# Patient Record
Sex: MALE | Race: BLACK OR AFRICAN AMERICAN | NOT HISPANIC OR LATINO | Employment: FULL TIME | URBAN - METROPOLITAN AREA
[De-identification: names, ages, dates, MRNs, and addresses within clinical notes are randomized per-mention and may not be internally consistent; named-entity substitution may affect disease eponyms.]

---

## 2021-06-09 ENCOUNTER — DOCUMENTATION ONLY (OUTPATIENT)
Dept: BARIATRICS | Facility: CLINIC | Age: 41
End: 2021-06-09

## 2021-06-10 ENCOUNTER — TELEPHONE (OUTPATIENT)
Dept: BARIATRICS | Facility: CLINIC | Age: 41
End: 2021-06-10

## 2021-06-28 ENCOUNTER — OFFICE VISIT (OUTPATIENT)
Dept: PODIATRY | Facility: CLINIC | Age: 41
End: 2021-06-28
Payer: COMMERCIAL

## 2021-06-28 VITALS — BODY MASS INDEX: 45.1 KG/M2 | HEIGHT: 70 IN | WEIGHT: 315 LBS

## 2021-06-28 DIAGNOSIS — M21.6X2 ACQUIRED EQUINUS DEFORMITY OF BOTH FEET: ICD-10-CM

## 2021-06-28 DIAGNOSIS — M21.6X1 ACQUIRED EQUINUS DEFORMITY OF BOTH FEET: ICD-10-CM

## 2021-06-28 DIAGNOSIS — M20.11 VALGUS DEFORMITY OF BOTH GREAT TOES: Primary | ICD-10-CM

## 2021-06-28 DIAGNOSIS — M19.079 ARTHRITIS OF FOOT: ICD-10-CM

## 2021-06-28 DIAGNOSIS — M20.12 VALGUS DEFORMITY OF BOTH GREAT TOES: Primary | ICD-10-CM

## 2021-06-28 DIAGNOSIS — M79.671 FOOT PAIN, BILATERAL: ICD-10-CM

## 2021-06-28 DIAGNOSIS — M79.672 FOOT PAIN, BILATERAL: ICD-10-CM

## 2021-06-28 PROCEDURE — 99204 OFFICE O/P NEW MOD 45 MIN: CPT | Mod: 25,S$GLB,, | Performed by: PODIATRIST

## 2021-06-28 PROCEDURE — 3008F BODY MASS INDEX DOCD: CPT | Mod: CPTII,S$GLB,, | Performed by: PODIATRIST

## 2021-06-28 PROCEDURE — 29540 STRAPPING ANKLE &/FOOT: CPT | Mod: 50,S$GLB,, | Performed by: PODIATRIST

## 2021-06-28 PROCEDURE — 99999 PR PBB SHADOW E&M-EST. PATIENT-LVL III: ICD-10-PCS | Mod: PBBFAC,,, | Performed by: PODIATRIST

## 2021-06-28 PROCEDURE — 1126F PR PAIN SEVERITY QUANTIFIED, NO PAIN PRESENT: ICD-10-PCS | Mod: S$GLB,,, | Performed by: PODIATRIST

## 2021-06-28 PROCEDURE — 29540 PR STRAPPING; ANKLE &/OR FOOT: ICD-10-PCS | Mod: 50,S$GLB,, | Performed by: PODIATRIST

## 2021-06-28 PROCEDURE — 3008F PR BODY MASS INDEX (BMI) DOCUMENTED: ICD-10-PCS | Mod: CPTII,S$GLB,, | Performed by: PODIATRIST

## 2021-06-28 PROCEDURE — 99204 PR OFFICE/OUTPT VISIT, NEW, LEVL IV, 45-59 MIN: ICD-10-PCS | Mod: 25,S$GLB,, | Performed by: PODIATRIST

## 2021-06-28 PROCEDURE — 1126F AMNT PAIN NOTED NONE PRSNT: CPT | Mod: S$GLB,,, | Performed by: PODIATRIST

## 2021-06-28 PROCEDURE — 99999 PR PBB SHADOW E&M-EST. PATIENT-LVL III: CPT | Mod: PBBFAC,,, | Performed by: PODIATRIST

## 2021-06-28 RX ORDER — MELOXICAM 15 MG/1
15 TABLET ORAL DAILY
Qty: 30 TABLET | Refills: 0 | Status: SHIPPED | OUTPATIENT
Start: 2021-06-28 | End: 2022-06-30

## 2021-06-28 RX ORDER — PHENTERMINE HYDROCHLORIDE 37.5 MG/1
37.5 TABLET ORAL DAILY
COMMUNITY
Start: 2021-02-04 | End: 2022-06-28

## 2021-10-11 ENCOUNTER — HOSPITAL ENCOUNTER (OUTPATIENT)
Dept: RADIOLOGY | Facility: HOSPITAL | Age: 41
Discharge: HOME | End: 2021-10-11
Attending: ORTHOPAEDIC SURGERY
Payer: COMMERCIAL

## 2021-10-11 ENCOUNTER — OFFICE VISIT (OUTPATIENT)
Dept: ORTHOPEDICS | Facility: CLINIC | Age: 41
End: 2021-10-11
Payer: COMMERCIAL

## 2021-10-11 VITALS — WEIGHT: 315 LBS | BODY MASS INDEX: 45.1 KG/M2 | HEIGHT: 70 IN

## 2021-10-11 DIAGNOSIS — M54.50 CHRONIC LEFT-SIDED LOW BACK PAIN WITHOUT SCIATICA: Primary | ICD-10-CM

## 2021-10-11 DIAGNOSIS — G89.29 CHRONIC LEFT-SIDED LOW BACK PAIN WITHOUT SCIATICA: Primary | ICD-10-CM

## 2021-10-11 DIAGNOSIS — M51.36 DDD (DEGENERATIVE DISC DISEASE), LUMBAR: ICD-10-CM

## 2021-10-11 PROCEDURE — 1160F PR REVIEW ALL MEDS BY PRESCRIBER/CLIN PHARMACIST DOCUMENTED: ICD-10-PCS | Mod: CPTII,S$GLB,, | Performed by: ORTHOPAEDIC SURGERY

## 2021-10-11 PROCEDURE — 72110 X-RAY EXAM L-2 SPINE 4/>VWS: CPT | Mod: 26,,, | Performed by: RADIOLOGY

## 2021-10-11 PROCEDURE — 1160F RVW MEDS BY RX/DR IN RCRD: CPT | Mod: CPTII,S$GLB,, | Performed by: ORTHOPAEDIC SURGERY

## 2021-10-11 PROCEDURE — 1159F PR MEDICATION LIST DOCUMENTED IN MEDICAL RECORD: ICD-10-PCS | Mod: CPTII,S$GLB,, | Performed by: ORTHOPAEDIC SURGERY

## 2021-10-11 PROCEDURE — 99204 PR OFFICE/OUTPT VISIT, NEW, LEVL IV, 45-59 MIN: ICD-10-PCS | Mod: S$GLB,,, | Performed by: ORTHOPAEDIC SURGERY

## 2021-10-11 PROCEDURE — 1159F MED LIST DOCD IN RCRD: CPT | Mod: CPTII,S$GLB,, | Performed by: ORTHOPAEDIC SURGERY

## 2021-10-11 PROCEDURE — 99999 PR PBB SHADOW E&M-EST. PATIENT-LVL III: CPT | Mod: PBBFAC,,, | Performed by: ORTHOPAEDIC SURGERY

## 2021-10-11 PROCEDURE — 72110 X-RAY EXAM L-2 SPINE 4/>VWS: CPT | Mod: TC

## 2021-10-11 PROCEDURE — 3008F PR BODY MASS INDEX (BMI) DOCUMENTED: ICD-10-PCS | Mod: CPTII,S$GLB,, | Performed by: ORTHOPAEDIC SURGERY

## 2021-10-11 PROCEDURE — 72110 XR LUMBAR SPINE AP AND LAT WITH FLEX/EXT: ICD-10-PCS | Mod: 26,,, | Performed by: RADIOLOGY

## 2021-10-11 PROCEDURE — 3008F BODY MASS INDEX DOCD: CPT | Mod: CPTII,S$GLB,, | Performed by: ORTHOPAEDIC SURGERY

## 2021-10-11 PROCEDURE — 99999 PR PBB SHADOW E&M-EST. PATIENT-LVL III: ICD-10-PCS | Mod: PBBFAC,,, | Performed by: ORTHOPAEDIC SURGERY

## 2021-10-11 PROCEDURE — 99204 OFFICE O/P NEW MOD 45 MIN: CPT | Mod: S$GLB,,, | Performed by: ORTHOPAEDIC SURGERY

## 2021-10-11 RX ORDER — CYCLOBENZAPRINE HCL 5 MG
5 TABLET ORAL NIGHTLY
Qty: 30 TABLET | Refills: 0 | Status: SHIPPED | OUTPATIENT
Start: 2021-10-11 | End: 2021-10-21

## 2021-10-11 RX ORDER — MELOXICAM 15 MG/1
15 TABLET ORAL DAILY
Qty: 30 TABLET | Refills: 0 | Status: SHIPPED | OUTPATIENT
Start: 2021-10-11 | End: 2022-06-30

## 2021-12-07 ENCOUNTER — LAB VISIT (OUTPATIENT)
Dept: LAB | Facility: HOSPITAL | Age: 41
End: 2021-12-07
Attending: STUDENT IN AN ORGANIZED HEALTH CARE EDUCATION/TRAINING PROGRAM
Payer: COMMERCIAL

## 2021-12-07 ENCOUNTER — OFFICE VISIT (OUTPATIENT)
Dept: BARIATRICS | Facility: CLINIC | Age: 41
End: 2021-12-07
Payer: COMMERCIAL

## 2021-12-07 VITALS
OXYGEN SATURATION: 97 % | HEART RATE: 98 BPM | SYSTOLIC BLOOD PRESSURE: 136 MMHG | HEIGHT: 70 IN | DIASTOLIC BLOOD PRESSURE: 82 MMHG | BODY MASS INDEX: 45.1 KG/M2 | WEIGHT: 315 LBS

## 2021-12-07 DIAGNOSIS — E66.01 CLASS 3 SEVERE OBESITY DUE TO EXCESS CALORIES WITHOUT SERIOUS COMORBIDITY WITH BODY MASS INDEX (BMI) OF 50.0 TO 59.9 IN ADULT: ICD-10-CM

## 2021-12-07 DIAGNOSIS — Z71.3 ENCOUNTER FOR WEIGHT LOSS COUNSELING: ICD-10-CM

## 2021-12-07 DIAGNOSIS — R73.03 PREDIABETES: ICD-10-CM

## 2021-12-07 DIAGNOSIS — E55.9 VITAMIN D DEFICIENCY: ICD-10-CM

## 2021-12-07 DIAGNOSIS — E66.01 CLASS 3 SEVERE OBESITY DUE TO EXCESS CALORIES WITHOUT SERIOUS COMORBIDITY WITH BODY MASS INDEX (BMI) OF 50.0 TO 59.9 IN ADULT: Primary | ICD-10-CM

## 2021-12-07 LAB
25(OH)D3+25(OH)D2 SERPL-MCNC: 9 NG/ML (ref 30–96)
ALBUMIN SERPL BCP-MCNC: 3.8 G/DL (ref 3.5–5.2)
ALP SERPL-CCNC: 83 U/L (ref 55–135)
ALT SERPL W/O P-5'-P-CCNC: 19 U/L (ref 10–44)
ANION GAP SERPL CALC-SCNC: 9 MMOL/L (ref 8–16)
AST SERPL-CCNC: 20 U/L (ref 10–40)
BASOPHILS # BLD AUTO: 0.07 K/UL (ref 0–0.2)
BASOPHILS NFR BLD: 0.8 % (ref 0–1.9)
BILIRUB SERPL-MCNC: 0.2 MG/DL (ref 0.1–1)
BUN SERPL-MCNC: 11 MG/DL (ref 6–20)
CALCIUM SERPL-MCNC: 9.1 MG/DL (ref 8.7–10.5)
CHLORIDE SERPL-SCNC: 102 MMOL/L (ref 95–110)
CHOLEST SERPL-MCNC: 186 MG/DL (ref 120–199)
CHOLEST/HDLC SERPL: 4.7 {RATIO} (ref 2–5)
CO2 SERPL-SCNC: 28 MMOL/L (ref 23–29)
CREAT SERPL-MCNC: 1 MG/DL (ref 0.5–1.4)
DIFFERENTIAL METHOD: ABNORMAL
EOSINOPHIL # BLD AUTO: 0.6 K/UL (ref 0–0.5)
EOSINOPHIL NFR BLD: 6.8 % (ref 0–8)
ERYTHROCYTE [DISTWIDTH] IN BLOOD BY AUTOMATED COUNT: 17.4 % (ref 11.5–14.5)
EST. GFR  (AFRICAN AMERICAN): >60 ML/MIN/1.73 M^2
EST. GFR  (NON AFRICAN AMERICAN): >60 ML/MIN/1.73 M^2
ESTIMATED AVG GLUCOSE: 128 MG/DL (ref 68–131)
GLUCOSE SERPL-MCNC: 108 MG/DL (ref 70–110)
HBA1C MFR BLD: 6.1 % (ref 4–5.6)
HCT VFR BLD AUTO: 39.9 % (ref 40–54)
HDLC SERPL-MCNC: 40 MG/DL (ref 40–75)
HDLC SERPL: 21.5 % (ref 20–50)
HGB BLD-MCNC: 11.8 G/DL (ref 14–18)
IMM GRANULOCYTES # BLD AUTO: 0.03 K/UL (ref 0–0.04)
IMM GRANULOCYTES NFR BLD AUTO: 0.3 % (ref 0–0.5)
LDLC SERPL CALC-MCNC: 128.2 MG/DL (ref 63–159)
LYMPHOCYTES # BLD AUTO: 3.4 K/UL (ref 1–4.8)
LYMPHOCYTES NFR BLD: 39.9 % (ref 18–48)
MCH RBC QN AUTO: 21.7 PG (ref 27–31)
MCHC RBC AUTO-ENTMCNC: 29.6 G/DL (ref 32–36)
MCV RBC AUTO: 73 FL (ref 82–98)
MONOCYTES # BLD AUTO: 0.8 K/UL (ref 0.3–1)
MONOCYTES NFR BLD: 8.9 % (ref 4–15)
NEUTROPHILS # BLD AUTO: 3.7 K/UL (ref 1.8–7.7)
NEUTROPHILS NFR BLD: 43.3 % (ref 38–73)
NONHDLC SERPL-MCNC: 146 MG/DL
NRBC BLD-RTO: 0 /100 WBC
PLATELET # BLD AUTO: 332 K/UL (ref 150–450)
PMV BLD AUTO: 10.9 FL (ref 9.2–12.9)
POTASSIUM SERPL-SCNC: 4.2 MMOL/L (ref 3.5–5.1)
PROT SERPL-MCNC: 8.1 G/DL (ref 6–8.4)
RBC # BLD AUTO: 5.44 M/UL (ref 4.6–6.2)
SODIUM SERPL-SCNC: 139 MMOL/L (ref 136–145)
TRIGL SERPL-MCNC: 89 MG/DL (ref 30–150)
TSH SERPL DL<=0.005 MIU/L-ACNC: 1.45 UIU/ML (ref 0.4–4)
WBC # BLD AUTO: 8.63 K/UL (ref 3.9–12.7)

## 2021-12-07 PROCEDURE — 99203 PR OFFICE/OUTPT VISIT, NEW, LEVL III, 30-44 MIN: ICD-10-PCS | Mod: S$GLB,,, | Performed by: STUDENT IN AN ORGANIZED HEALTH CARE EDUCATION/TRAINING PROGRAM

## 2021-12-07 PROCEDURE — 80061 LIPID PANEL: CPT | Performed by: STUDENT IN AN ORGANIZED HEALTH CARE EDUCATION/TRAINING PROGRAM

## 2021-12-07 PROCEDURE — 83036 HEMOGLOBIN GLYCOSYLATED A1C: CPT | Performed by: STUDENT IN AN ORGANIZED HEALTH CARE EDUCATION/TRAINING PROGRAM

## 2021-12-07 PROCEDURE — 99999 PR PBB SHADOW E&M-EST. PATIENT-LVL III: CPT | Mod: PBBFAC,,, | Performed by: STUDENT IN AN ORGANIZED HEALTH CARE EDUCATION/TRAINING PROGRAM

## 2021-12-07 PROCEDURE — 84443 ASSAY THYROID STIM HORMONE: CPT | Performed by: STUDENT IN AN ORGANIZED HEALTH CARE EDUCATION/TRAINING PROGRAM

## 2021-12-07 PROCEDURE — 82306 VITAMIN D 25 HYDROXY: CPT | Performed by: STUDENT IN AN ORGANIZED HEALTH CARE EDUCATION/TRAINING PROGRAM

## 2021-12-07 PROCEDURE — 99999 PR PBB SHADOW E&M-EST. PATIENT-LVL III: ICD-10-PCS | Mod: PBBFAC,,, | Performed by: STUDENT IN AN ORGANIZED HEALTH CARE EDUCATION/TRAINING PROGRAM

## 2021-12-07 PROCEDURE — 85025 COMPLETE CBC W/AUTO DIFF WBC: CPT | Performed by: STUDENT IN AN ORGANIZED HEALTH CARE EDUCATION/TRAINING PROGRAM

## 2021-12-07 PROCEDURE — 99203 OFFICE O/P NEW LOW 30 MIN: CPT | Mod: S$GLB,,, | Performed by: STUDENT IN AN ORGANIZED HEALTH CARE EDUCATION/TRAINING PROGRAM

## 2021-12-07 PROCEDURE — 80053 COMPREHEN METABOLIC PANEL: CPT | Performed by: STUDENT IN AN ORGANIZED HEALTH CARE EDUCATION/TRAINING PROGRAM

## 2021-12-07 PROCEDURE — 36415 COLL VENOUS BLD VENIPUNCTURE: CPT | Performed by: STUDENT IN AN ORGANIZED HEALTH CARE EDUCATION/TRAINING PROGRAM

## 2021-12-07 RX ORDER — SEMAGLUTIDE 1.34 MG/ML
INJECTION, SOLUTION SUBCUTANEOUS
Qty: 1 PEN | Refills: 0 | Status: SHIPPED | OUTPATIENT
Start: 2021-12-07 | End: 2022-01-13 | Stop reason: SDUPTHER

## 2021-12-08 ENCOUNTER — PATIENT MESSAGE (OUTPATIENT)
Dept: BARIATRICS | Facility: CLINIC | Age: 41
End: 2021-12-08
Payer: COMMERCIAL

## 2021-12-08 RX ORDER — ERGOCALCIFEROL 1.25 MG/1
50000 CAPSULE ORAL WEEKLY
Qty: 12 CAPSULE | Refills: 0 | Status: SHIPPED | OUTPATIENT
Start: 2021-12-08 | End: 2022-03-08

## 2022-01-13 RX ORDER — SEMAGLUTIDE 1.34 MG/ML
0.5 INJECTION, SOLUTION SUBCUTANEOUS
Qty: 1 PEN | Refills: 2 | Status: SHIPPED | OUTPATIENT
Start: 2022-01-13 | End: 2022-01-13

## 2022-01-13 RX ORDER — SEMAGLUTIDE 1.34 MG/ML
0.5 INJECTION, SOLUTION SUBCUTANEOUS
Qty: 1 PEN | Refills: 2 | Status: SHIPPED | OUTPATIENT
Start: 2022-01-13 | End: 2022-02-22

## 2022-01-13 NOTE — ADDENDUM NOTE
Addended by: JEANNETTE BURROWS on: 1/13/2022 12:42 PM     Modules accepted: Orders     Subjective:       Patient ID: Gopi Garcia is a 62 y.o. male Body mass index is 38.62 kg/m².    Chief Complaint: Abdominal Pain    This patient is new to me.  Referring Provider: Katerin Sol for COLITIS.     Abdominal Pain   This is a new problem. Episode onset: started ~8-10 days. The problem occurs constantly. The problem has been gradually improving (mild improvement). The pain is located in the LLQ and LUQ. The pain is at a severity of 4/10 (currently). The quality of the pain is aching and sharp (sharp with pressure to site). The abdominal pain does not radiate. Pertinent negatives include no belching, constipation, diarrhea, dysuria, fever, flatus, hematochezia, melena, nausea, vomiting or weight loss. Exacerbated by: stretching, bending forward. Relieved by: sitting or standing helps decrease the pain. He has tried antibiotics (AUGMENTIN 875-125 mg bid x 10 days, bentyl 20 mg bid prn, percocet PRN taking 2 times a day, mobic daily) for the symptoms. The treatment provided mild relief. Prior diagnostic workup includes CT scan. There is no history of Crohn's disease, GERD, pancreatitis, PUD or ulcerative colitis.     Review of Systems   Constitutional: Positive for appetite change (decreased). Negative for chills, fatigue, fever and weight loss.   HENT: Negative for sore throat and trouble swallowing.    Respiratory: Negative for cough, choking and shortness of breath.    Cardiovascular: Negative for chest pain.   Gastrointestinal: Positive for abdominal pain. Negative for anal bleeding, blood in stool, constipation, diarrhea, flatus, hematochezia, melena, nausea, rectal pain and vomiting.   Genitourinary: Negative for difficulty urinating, dysuria and flank pain.   Skin: Positive for rash (had rash to chest and forearms (reports it was nonraised and blanched) when symptoms first started; rash has resolved).   Neurological: Negative for weakness.       Past Medical History:   Diagnosis Date     Anxiety     AR (allergic rhinitis)     CAD (coronary artery disease)     Chronic low back pain     Colon polyp 2010    told to repeat 5 yrs    Coronary artery disease     Depression     Diverticulosis     ED (erectile dysfunction)     Fatty liver     Hepatomegaly     HTN (hypertension)     Hyperlipidemia     Myocardial infarct, old     Pulmonary nodule, left 1/2/2020    Repeat CT January 2021    Sleep apnea      Past Surgical History:   Procedure Laterality Date    CARDIAC CATHETERIZATION  2006    COLONOSCOPY  52 years old    colon polyps removed per patient report    INGUINAL HERNIA REPAIR Right 2011    VASECTOMY  2005     Family History   Problem Relation Age of Onset    Lung cancer Father     Alzheimer's disease Mother     Heart attack Paternal Grandfather     Prostate cancer Brother     Colon polyps Brother     Colon cancer Neg Hx     Crohn's disease Neg Hx     Ulcerative colitis Neg Hx     Stomach cancer Neg Hx     Esophageal cancer Neg Hx      Wt Readings from Last 10 Encounters:   04/28/20 (!) 140.2 kg (309 lb)   04/23/20 (!) 140.2 kg (309 lb)   01/16/20 (!) 140.6 kg (309 lb 15.5 oz)   12/16/19 (!) 138.8 kg (306 lb)   08/13/19 (!) 139.7 kg (307 lb 15.7 oz)   11/15/18 (!) 138.1 kg (304 lb 7.3 oz)   09/07/18 135.2 kg (298 lb)   08/31/18 136.1 kg (300 lb)   08/28/18 135.2 kg (298 lb)   08/10/18 (!) 136.2 kg (300 lb 4.3 oz)     Lab Results   Component Value Date    WBC 9.19 04/23/2020    HGB 16.2 04/23/2020    HCT 46.6 04/23/2020    MCV 91 04/23/2020     04/23/2020     CMP  Sodium   Date Value Ref Range Status   01/14/2020 141 136 - 145 mmol/L Final     Potassium   Date Value Ref Range Status   01/14/2020 4.0 3.5 - 5.1 mmol/L Final     Chloride   Date Value Ref Range Status   01/14/2020 101 95 - 110 mmol/L Final     CO2   Date Value Ref Range Status   01/14/2020 30 (H) 23 - 29 mmol/L Final     Glucose   Date Value Ref Range Status   01/14/2020 124 (H) 70 - 110 mg/dL Final      BUN, Bld   Date Value Ref Range Status   01/14/2020 12 6 - 20 mg/dL Final     Creatinine   Date Value Ref Range Status   01/14/2020 1.1 0.5 - 1.4 mg/dL Final     Calcium   Date Value Ref Range Status   01/14/2020 9.2 8.7 - 10.5 mg/dL Final     Total Protein   Date Value Ref Range Status   01/14/2020 7.0 6.0 - 8.4 g/dL Final     Albumin   Date Value Ref Range Status   01/14/2020 4.3 3.5 - 5.2 g/dL Final     Total Bilirubin   Date Value Ref Range Status   01/14/2020 1.1 (H) 0.1 - 1.0 mg/dL Final     Comment:     For infants and newborns, interpretation of results should be based  on gestational age, weight and in agreement with clinical  observations.  Premature Infant recommended reference ranges:  Up to 24 hours.............<8.0 mg/dL  Up to 48 hours............<12.0 mg/dL  3-5 days..................<15.0 mg/dL  6-29 days.................<15.0 mg/dL       Alkaline Phosphatase   Date Value Ref Range Status   01/14/2020 83 55 - 135 U/L Final     AST   Date Value Ref Range Status   01/14/2020 24 10 - 40 U/L Final     ALT   Date Value Ref Range Status   01/14/2020 18 10 - 44 U/L Final     Anion Gap   Date Value Ref Range Status   01/14/2020 10 8 - 16 mmol/L Final     eGFR if    Date Value Ref Range Status   01/14/2020 >60.0 >60 mL/min/1.73 m^2 Final     eGFR if non    Date Value Ref Range Status   01/14/2020 >60.0 >60 mL/min/1.73 m^2 Final     Comment:     Calculation used to obtain the estimated glomerular filtration  rate (eGFR) is the CKD-EPI equation.        Lab Results   Component Value Date    TSH 0.881 01/14/2020     Reviewed prior medical records including radiology report of 4/23/2020 CT abdomen pelvis.    Objective:      Physical Exam   Constitutional: He is oriented to person, place, and time. He appears well-developed and well-nourished. No distress.   Patient is wearing a face mask due to COVID 19 concerns.   Eyes: Pupils are equal, round, and reactive to light.  Conjunctivae are normal. No scleral icterus.   Pulmonary/Chest: Effort normal and breath sounds normal. No respiratory distress. He has no wheezes.   Abdominal: Soft. Normal appearance and bowel sounds are normal. He exhibits no distension, no abdominal bruit and no mass. There is tenderness (mild) in the left lower quadrant. There is no rigidity, no rebound, no guarding, no tenderness at McBurney's point and negative Pires's sign.   Neurological: He is alert and oriented to person, place, and time.   Skin: Skin is warm and dry. No rash noted. He is not diaphoretic. No erythema. No pallor.   Non-jaundiced   Psychiatric: He has a normal mood and affect. His behavior is normal. Judgment and thought content normal.   Nursing note and vitals reviewed.      Assessment:       1. Left sided abdominal pain    2. Abnormal CT scan, gastrointestinal tract    3. Colitis    4. History of diverticulosis    5. Serum total bilirubin elevated    6. Decreased appetite    7. History of rash and nonspecific skin eruption    8. Anticoagulant long-term use        Plan:       Left sided abdominal pain, Abnormal CT scan, gastrointestinal tract, History of diverticulosis, & Colitis  - schedule Colonoscopy to be done in 4-6 weeks, discussed procedure with the patient, verbalized understanding  - CONTINUE BENTYL 20 MG BID PRN AS DIRECTED  - CONTINUE AUGMENTIN AS PRESCRIBED; if symptoms do not continue to improve/worsen, we may need to extend antibiotic course or try a different course of antibiotics; will reassess at follow-up appointment in 1 week.  - discussed the diagnosis of diverticulosis and diverticulitis, advised to continue a low fiber diet for the next 4 weeks and then slowly increase fiber in diet. Advised a low fiber diet is recommended for diverticulitis (for about 4 weeks), but to prevent diverticulitis, high fiber diet is recommended.  -Recommended high fiber diet after episode has completely resolved. Recommended daily  exercise, adequate water intake (six 8-oz glasses of water daily), and high fiber diet. OTC fiber supplements are recommended if diet does not reach daily fiber goal (25 grams daily), such as Metamucil, Citrucel, or FiberCon (take as directed, separate from other oral medications by >2 hours).  - instructed patient that if symptoms do not resolve or if worsen prior to colonoscopy to contact us or go to ER, patient verbalized understanding    Serum total bilirubin elevated  - minimize/avoid alcohol and tylenol products, & follow-up with PCP for continued evaluation and management; if specialist is needed, recommend seeing hepatology.    Decreased appetite  - discussed about possible medication for this; patient declined medication at this time; if it persist, we may need EGD to further evaluate symptom  - encouraged PO intake and daily calorie counts to ensure adequate nutrition is taken in, recommend at least 2,000 calories a day  - recommend nutritional drinks, such as Boost, Ensure or Glucerna, to supplement nutrition needs  - avoid/minimize use of NSAIDs- since they can cause GI upset, bleeding and/or ulcers. If NSAID must be taken, recommend take with food.  - discussed with patient that a side effect of narcotic pain medications is nausea, advised patient to talk to provider who manages pain medication, patient verbalized understanding    History of rash and nonspecific skin eruption  Recommend follow-up with Primary Care Provider for continued evaluation and management if rash recurs.    Anticoagulant long-term use  - informed patient that the anticoagulant(s) will likely need to be held for endoscopy, nurse will confirm with endoscopist, cardiologist, and/or PCP.    Follow up in about 1 week (around 5/5/2020), or if symptoms worsen or fail to improve.      If no improvement in symptoms or symptoms worsen, call/follow-up at clinic or go to ER.

## 2022-06-28 ENCOUNTER — OFFICE VISIT (OUTPATIENT)
Dept: BARIATRICS | Facility: CLINIC | Age: 42
End: 2022-06-28
Payer: COMMERCIAL

## 2022-06-28 VITALS
HEIGHT: 70 IN | TEMPERATURE: 99 F | HEART RATE: 90 BPM | WEIGHT: 315 LBS | BODY MASS INDEX: 45.1 KG/M2 | OXYGEN SATURATION: 95 % | SYSTOLIC BLOOD PRESSURE: 142 MMHG | DIASTOLIC BLOOD PRESSURE: 86 MMHG

## 2022-06-28 DIAGNOSIS — E66.01 CLASS 3 SEVERE OBESITY DUE TO EXCESS CALORIES WITH SERIOUS COMORBIDITY AND BODY MASS INDEX (BMI) OF 45.0 TO 49.9 IN ADULT: Primary | ICD-10-CM

## 2022-06-28 DIAGNOSIS — Z71.3 ENCOUNTER FOR WEIGHT LOSS COUNSELING: ICD-10-CM

## 2022-06-28 DIAGNOSIS — E55.9 VITAMIN D DEFICIENCY: ICD-10-CM

## 2022-06-28 PROCEDURE — 99213 OFFICE O/P EST LOW 20 MIN: CPT | Mod: S$GLB,,, | Performed by: STUDENT IN AN ORGANIZED HEALTH CARE EDUCATION/TRAINING PROGRAM

## 2022-06-28 PROCEDURE — 3077F SYST BP >= 140 MM HG: CPT | Mod: CPTII,S$GLB,, | Performed by: STUDENT IN AN ORGANIZED HEALTH CARE EDUCATION/TRAINING PROGRAM

## 2022-06-28 PROCEDURE — 1160F PR REVIEW ALL MEDS BY PRESCRIBER/CLIN PHARMACIST DOCUMENTED: ICD-10-PCS | Mod: CPTII,S$GLB,, | Performed by: STUDENT IN AN ORGANIZED HEALTH CARE EDUCATION/TRAINING PROGRAM

## 2022-06-28 PROCEDURE — 3077F PR MOST RECENT SYSTOLIC BLOOD PRESSURE >= 140 MM HG: ICD-10-PCS | Mod: CPTII,S$GLB,, | Performed by: STUDENT IN AN ORGANIZED HEALTH CARE EDUCATION/TRAINING PROGRAM

## 2022-06-28 PROCEDURE — 99213 PR OFFICE/OUTPT VISIT, EST, LEVL III, 20-29 MIN: ICD-10-PCS | Mod: S$GLB,,, | Performed by: STUDENT IN AN ORGANIZED HEALTH CARE EDUCATION/TRAINING PROGRAM

## 2022-06-28 PROCEDURE — 1159F PR MEDICATION LIST DOCUMENTED IN MEDICAL RECORD: ICD-10-PCS | Mod: CPTII,S$GLB,, | Performed by: STUDENT IN AN ORGANIZED HEALTH CARE EDUCATION/TRAINING PROGRAM

## 2022-06-28 PROCEDURE — 3079F DIAST BP 80-89 MM HG: CPT | Mod: CPTII,S$GLB,, | Performed by: STUDENT IN AN ORGANIZED HEALTH CARE EDUCATION/TRAINING PROGRAM

## 2022-06-28 PROCEDURE — 3008F PR BODY MASS INDEX (BMI) DOCUMENTED: ICD-10-PCS | Mod: CPTII,S$GLB,, | Performed by: STUDENT IN AN ORGANIZED HEALTH CARE EDUCATION/TRAINING PROGRAM

## 2022-06-28 PROCEDURE — 3079F PR MOST RECENT DIASTOLIC BLOOD PRESSURE 80-89 MM HG: ICD-10-PCS | Mod: CPTII,S$GLB,, | Performed by: STUDENT IN AN ORGANIZED HEALTH CARE EDUCATION/TRAINING PROGRAM

## 2022-06-28 PROCEDURE — 99999 PR PBB SHADOW E&M-EST. PATIENT-LVL III: ICD-10-PCS | Mod: PBBFAC,,, | Performed by: STUDENT IN AN ORGANIZED HEALTH CARE EDUCATION/TRAINING PROGRAM

## 2022-06-28 PROCEDURE — 99999 PR PBB SHADOW E&M-EST. PATIENT-LVL III: CPT | Mod: PBBFAC,,, | Performed by: STUDENT IN AN ORGANIZED HEALTH CARE EDUCATION/TRAINING PROGRAM

## 2022-06-28 PROCEDURE — 1160F RVW MEDS BY RX/DR IN RCRD: CPT | Mod: CPTII,S$GLB,, | Performed by: STUDENT IN AN ORGANIZED HEALTH CARE EDUCATION/TRAINING PROGRAM

## 2022-06-28 PROCEDURE — 3008F BODY MASS INDEX DOCD: CPT | Mod: CPTII,S$GLB,, | Performed by: STUDENT IN AN ORGANIZED HEALTH CARE EDUCATION/TRAINING PROGRAM

## 2022-06-28 PROCEDURE — 1159F MED LIST DOCD IN RCRD: CPT | Mod: CPTII,S$GLB,, | Performed by: STUDENT IN AN ORGANIZED HEALTH CARE EDUCATION/TRAINING PROGRAM

## 2022-06-28 RX ORDER — SEMAGLUTIDE 1.34 MG/ML
1 INJECTION, SOLUTION SUBCUTANEOUS
Qty: 3 PEN | Refills: 0 | Status: SHIPPED | OUTPATIENT
Start: 2022-06-28 | End: 2022-09-06 | Stop reason: SDUPTHER

## 2022-06-28 RX ORDER — ERGOCALCIFEROL 1.25 MG/1
50000 CAPSULE ORAL WEEKLY
Qty: 12 CAPSULE | Refills: 0 | Status: SHIPPED | OUTPATIENT
Start: 2022-06-28 | End: 2022-09-26

## 2022-06-28 NOTE — PROGRESS NOTES
Subjective:       Patient ID: Finesse Irvin is a 42 y.o. male.    Chief Complaint: Follow-up, Obesity, and Weight Check    Patient presents for treatment of obesity.   Follow-up, appointment #2    Has gained 100 lbs since pandemic    Co-morbidities:   Asthma  Prediabetes  Vitamin D Deficiency    Negative for pancreatitis  Negative for thyroid cancer     History of Weight Loss Efforts  Adipex earlier this year - stopped taking because made him feel jittery    Current Physical Activity  No regular exercise   for work    Current Eating Habits  Breakfast - skips  Lunch - fast food  Dinner - fast food; cooks about 1x/week  Snacks - chips  Beverages - powerade, water    Alcohol: occassionally    Tobacco: no      Medical Weight Loss  12/7/2021: 360.9 lbs, BMI 47.6, BFP 40.8%, .1 lbs, BMR 2463 kcal; Ozempic  6/28/2022: 345.3 lbs, BMI 45.6, BFP 40.9%, .1 lbs, BMR 2368 kcal; Ozempic    Follow-up  Pertinent negatives include no abdominal pain, chest pain, chills, fever, nausea or vomiting.     Review of Systems   Constitutional: Negative for chills and fever.   Respiratory: Negative for shortness of breath.    Cardiovascular: Negative for chest pain, palpitations and leg swelling.   Gastrointestinal: Negative for abdominal pain, nausea and vomiting.   Neurological: Negative for dizziness and light-headedness.   Psychiatric/Behavioral: The patient is not nervous/anxious.          Objective:     Results for FINESSE IRVIN (MRN [id number]) as of 12/8/2021 14:21   Ref. Range 12/7/2021 09:25   WBC Latest Ref Range: 3.90 - 12.70 K/uL 8.63   RBC Latest Ref Range: 4.60 - 6.20 M/uL 5.44   Hemoglobin Latest Ref Range: 14.0 - 18.0 g/dL 11.8 (L)   Hematocrit Latest Ref Range: 40.0 - 54.0 % 39.9 (L)   MCV Latest Ref Range: 82 - 98 fL 73 (L)   MCH Latest Ref Range: 27.0 - 31.0 pg 21.7 (L)   MCHC Latest Ref Range: 32.0 - 36.0 g/dL 29.6 (L)   RDW Latest Ref Range: 11.5 - 14.5 % 17.4 (H)   Platelets Latest Ref Range:  150 - 450 K/uL 332   MPV Latest Ref Range: 9.2 - 12.9 fL 10.9   Gran % Latest Ref Range: 38.0 - 73.0 % 43.3   Lymph % Latest Ref Range: 18.0 - 48.0 % 39.9   Mono % Latest Ref Range: 4.0 - 15.0 % 8.9   Eosinophil % Latest Ref Range: 0.0 - 8.0 % 6.8   Basophil % Latest Ref Range: 0.0 - 1.9 % 0.8   Immature Granulocytes Latest Ref Range: 0.0 - 0.5 % 0.3   Gran # (ANC) Latest Ref Range: 1.8 - 7.7 K/uL 3.7   Lymph # Latest Ref Range: 1.0 - 4.8 K/uL 3.4   Mono # Latest Ref Range: 0.3 - 1.0 K/uL 0.8   Eos # Latest Ref Range: 0.0 - 0.5 K/uL 0.6 (H)   Baso # Latest Ref Range: 0.00 - 0.20 K/uL 0.07   Immature Grans (Abs) Latest Ref Range: 0.00 - 0.04 K/uL 0.03   nRBC Latest Ref Range: 0 /100 WBC 0   Differential Method Unknown Automated   Sodium Latest Ref Range: 136 - 145 mmol/L 139   Potassium Latest Ref Range: 3.5 - 5.1 mmol/L 4.2   Chloride Latest Ref Range: 95 - 110 mmol/L 102   CO2 Latest Ref Range: 23 - 29 mmol/L 28   Anion Gap Latest Ref Range: 8 - 16 mmol/L 9   BUN Latest Ref Range: 6 - 20 mg/dL 11   Creatinine Latest Ref Range: 0.5 - 1.4 mg/dL 1.0   eGFR if non African American Latest Ref Range: >60 mL/min/1.73 m^2 >60.0   eGFR if African American Latest Ref Range: >60 mL/min/1.73 m^2 >60.0   Glucose Latest Ref Range: 70 - 110 mg/dL 108   Calcium Latest Ref Range: 8.7 - 10.5 mg/dL 9.1   Alkaline Phosphatase Latest Ref Range: 55 - 135 U/L 83   PROTEIN TOTAL Latest Ref Range: 6.0 - 8.4 g/dL 8.1   Albumin Latest Ref Range: 3.5 - 5.2 g/dL 3.8   BILIRUBIN TOTAL Latest Ref Range: 0.1 - 1.0 mg/dL 0.2   AST Latest Ref Range: 10 - 40 U/L 20   ALT Latest Ref Range: 10 - 44 U/L 19   Triglycerides Latest Ref Range: 30 - 150 mg/dL 89   Cholesterol Latest Ref Range: 120 - 199 mg/dL 186   HDL Latest Ref Range: 40 - 75 mg/dL 40   HDL/Cholesterol Ratio Latest Ref Range: 20.0 - 50.0 % 21.5   LDL Cholesterol External Latest Ref Range: 63.0 - 159.0 mg/dL 128.2   Non-HDL Cholesterol Latest Units: mg/dL 146   Total Cholesterol/HDL Ratio  Latest Ref Range: 2.0 - 5.0  4.7   Vit D, 25-Hydroxy Latest Ref Range: 30 - 96 ng/mL 9 (L)   Hemoglobin A1C External Latest Ref Range: 4.0 - 5.6 % 6.1 (H)   Estimated Avg Glucose Latest Ref Range: 68 - 131 mg/dL 128   TSH Latest Ref Range: 0.400 - 4.000 uIU/mL 1.450         Vitals:    06/28/22 0818   BP: (!) 142/86   Pulse: 90   Temp: 98.5 °F (36.9 °C)       Physical Exam  Vitals reviewed.   Constitutional:       General: He is not in acute distress.     Appearance: Normal appearance. He is obese. He is not ill-appearing, toxic-appearing or diaphoretic.   HENT:      Head: Normocephalic and atraumatic.   Eyes:      Extraocular Movements: Extraocular movements intact.   Cardiovascular:      Rate and Rhythm: Normal rate.   Pulmonary:      Effort: Pulmonary effort is normal. No respiratory distress.   Musculoskeletal:         General: Normal range of motion.      Cervical back: Normal range of motion.   Skin:     General: Skin is warm and dry.   Neurological:      General: No focal deficit present.      Mental Status: He is alert and oriented to person, place, and time.      Gait: Gait normal.   Psychiatric:         Mood and Affect: Mood normal.         Behavior: Behavior normal.         Assessment:       Problem List Items Addressed This Visit     Vitamin D deficiency      Other Visit Diagnoses     Class 3 severe obesity due to excess calories with serious comorbidity and body mass index (BMI) of 45.0 to 49.9 in adult    -  Primary    Encounter for weight loss counseling              Plan:   - Ozempic 1 mg weekly.     - Continue vitamin D supplements    - Referred to PCP for further workup of anemia, elevated blood pressure    - Log all food and beverage intake with a daily calorie goal of 2000 calories per day    - Moderate intensity aerobic exercise for 30 minutes 3-5x/week    - Return to clinic in 4 weeks

## 2022-06-29 ENCOUNTER — TELEPHONE (OUTPATIENT)
Dept: FAMILY MEDICINE | Facility: CLINIC | Age: 42
End: 2022-06-29
Payer: COMMERCIAL

## 2022-06-29 PROBLEM — E55.9 VITAMIN D DEFICIENCY: Status: ACTIVE | Noted: 2022-06-29

## 2022-06-30 ENCOUNTER — OFFICE VISIT (OUTPATIENT)
Dept: FAMILY MEDICINE | Facility: CLINIC | Age: 42
End: 2022-06-30
Payer: COMMERCIAL

## 2022-06-30 VITALS
TEMPERATURE: 98 F | OXYGEN SATURATION: 95 % | BODY MASS INDEX: 46.65 KG/M2 | SYSTOLIC BLOOD PRESSURE: 138 MMHG | DIASTOLIC BLOOD PRESSURE: 74 MMHG | HEIGHT: 69 IN | RESPIRATION RATE: 18 BRPM | WEIGHT: 315 LBS | HEART RATE: 90 BPM

## 2022-06-30 DIAGNOSIS — R06.83 PRIMARY SNORING: ICD-10-CM

## 2022-06-30 DIAGNOSIS — K21.9 GASTROESOPHAGEAL REFLUX DISEASE, UNSPECIFIED WHETHER ESOPHAGITIS PRESENT: ICD-10-CM

## 2022-06-30 DIAGNOSIS — J45.20 MILD INTERMITTENT EXTRINSIC ASTHMA WITHOUT COMPLICATION: ICD-10-CM

## 2022-06-30 DIAGNOSIS — R73.03 PREDIABETES: ICD-10-CM

## 2022-06-30 DIAGNOSIS — J30.2 SEASONAL ALLERGIES: ICD-10-CM

## 2022-06-30 DIAGNOSIS — Z00.00 VISIT FOR WELL MAN HEALTH CHECK: Primary | ICD-10-CM

## 2022-06-30 DIAGNOSIS — Z11.59 ENCOUNTER FOR HEPATITIS C SCREENING TEST FOR LOW RISK PATIENT: ICD-10-CM

## 2022-06-30 PROBLEM — J45.909 EXTRINSIC ASTHMA WITHOUT COMPLICATION: Status: ACTIVE | Noted: 2022-06-30

## 2022-06-30 PROCEDURE — 99999 PR PBB SHADOW E&M-EST. PATIENT-LVL V: ICD-10-PCS | Mod: PBBFAC,,, | Performed by: FAMILY MEDICINE

## 2022-06-30 PROCEDURE — 3078F DIAST BP <80 MM HG: CPT | Mod: CPTII,S$GLB,, | Performed by: FAMILY MEDICINE

## 2022-06-30 PROCEDURE — 1159F PR MEDICATION LIST DOCUMENTED IN MEDICAL RECORD: ICD-10-PCS | Mod: CPTII,S$GLB,, | Performed by: FAMILY MEDICINE

## 2022-06-30 PROCEDURE — 3075F PR MOST RECENT SYSTOLIC BLOOD PRESS GE 130-139MM HG: ICD-10-PCS | Mod: CPTII,S$GLB,, | Performed by: FAMILY MEDICINE

## 2022-06-30 PROCEDURE — 99999 PR PBB SHADOW E&M-EST. PATIENT-LVL V: CPT | Mod: PBBFAC,,, | Performed by: FAMILY MEDICINE

## 2022-06-30 PROCEDURE — 3008F BODY MASS INDEX DOCD: CPT | Mod: CPTII,S$GLB,, | Performed by: FAMILY MEDICINE

## 2022-06-30 PROCEDURE — 1159F MED LIST DOCD IN RCRD: CPT | Mod: CPTII,S$GLB,, | Performed by: FAMILY MEDICINE

## 2022-06-30 PROCEDURE — 3008F PR BODY MASS INDEX (BMI) DOCUMENTED: ICD-10-PCS | Mod: CPTII,S$GLB,, | Performed by: FAMILY MEDICINE

## 2022-06-30 PROCEDURE — 3078F PR MOST RECENT DIASTOLIC BLOOD PRESSURE < 80 MM HG: ICD-10-PCS | Mod: CPTII,S$GLB,, | Performed by: FAMILY MEDICINE

## 2022-06-30 PROCEDURE — 99386 PREV VISIT NEW AGE 40-64: CPT | Mod: S$GLB,,, | Performed by: FAMILY MEDICINE

## 2022-06-30 PROCEDURE — 1160F PR REVIEW ALL MEDS BY PRESCRIBER/CLIN PHARMACIST DOCUMENTED: ICD-10-PCS | Mod: CPTII,S$GLB,, | Performed by: FAMILY MEDICINE

## 2022-06-30 PROCEDURE — 99386 PR PREVENTIVE VISIT,NEW,40-64: ICD-10-PCS | Mod: S$GLB,,, | Performed by: FAMILY MEDICINE

## 2022-06-30 PROCEDURE — 3075F SYST BP GE 130 - 139MM HG: CPT | Mod: CPTII,S$GLB,, | Performed by: FAMILY MEDICINE

## 2022-06-30 PROCEDURE — 1160F RVW MEDS BY RX/DR IN RCRD: CPT | Mod: CPTII,S$GLB,, | Performed by: FAMILY MEDICINE

## 2022-06-30 RX ORDER — PANTOPRAZOLE SODIUM 40 MG/1
40 TABLET, DELAYED RELEASE ORAL DAILY
Qty: 30 TABLET | Refills: 1 | Status: SHIPPED | OUTPATIENT
Start: 2022-06-30 | End: 2023-06-30

## 2022-06-30 NOTE — PROGRESS NOTES
"Well Man VISIT      CHIEF COMPLAINT  Chief Complaint   Patient presents with    [facility]       HPI  Finesse Irvin is a 42 y.o. male who presents for [facility].     Social Factors  Tobacco use: yes  Ready to Quit: Yes   Alcohol: Yes on occasion  Intimate partner violence screening  "Do you feel safe in your current relationship?" Yes   "Have you ever been in a relationship in which your partner frightened you or hurt you?" No  Living Will/POA: No  Regular Exercise: No    Depression  Over the past two weeks, have you felt down, depressed, or hopeless? No  Over the past two weeks, have you felt little interest or pleasure in doing things? No    Reproductive Health  STD screening in last year: declined  HIV screening: declined    Screen for Chronic Disease  CHD Risk Factors: male gender and obesity (BMI >= 30 kg/m2)  Estimated body mass index is 51.57 kg/m² as calculated from the following:    Height as of this encounter: 5' 9" (1.753 m).    Weight as of this encounter: 158.4 kg (349 lb 3.3 oz).  Dyslipidemia screening needed: ordered  T2DM screening needed: ordered  Colonoscopy needed: n/a  PSA needed: n/a  AAA screening needed:n/a  Screen men 35 years and older, and men 20 to 34 years of age who have cardiovascular risk factors for dyslipidemia  Begin screening colonoscopies at 50 years of age in men of average risk, and continue until 75 years of age; offer fecal occult blood testing every year, flexible sigmoidoscopy every five years combined with fecal occult blood testing every three years, or colonoscopy every 10 years   The American U[employer] Association recommends offering PSA testing and digital rectal examination to well-informed men beginning at 40 years of age and continuing until life expectancy is less than 10 years  Screen once with ultrasonography in men 65 to 75 years of age if they have a family history or have smoked at [id number] cigarettes in their lifetime  Screen men with a sustained " "blood pressure greater than 135/80 mm Hg for T2DM      Immunizations  delayed    ALLERGIES and MEDS were verified.   PMHx, PSHx, FHx, SOCIALHx were updated as pertinent.    REVIEW OF SYSTEMS  Review of Systems   Constitutional: Positive for malaise/fatigue. Negative for chills and fever.   HENT: Negative.    Eyes: Negative.    Respiratory: Negative.    Cardiovascular: Negative.    Gastrointestinal: Positive for vomiting.   Genitourinary: Negative.    Musculoskeletal: Negative.    Skin: Negative.    Psychiatric/Behavioral: Negative.          PHYSICAL EXAM  VITAL SIGNS: /74   Pulse 90   Temp 98.2 °F (36.8 °C) (Oral)   Resp 18   Ht 5' 9" (1.753 m)   Wt (!) 158.4 kg (349 lb 3.3 oz)   SpO2 95%   BMI 51.57 kg/m²   GEN: Well developed, Well nourished, No acute distress.  HENT: Normocephalic, Atraumatic, Bilateral external ears normal, Nose normal, Oropharynx moist, No oral exudates.   Eyes: PERRL, EOMI, Conjunctiva normal, No discharge.   Neck: Supple, No tenderness.  Lymphatic: No cervical or supraclavicular lymphadenopathy noted.   Cardiovascular: Normal heart rate, Normal rhythm, No murmurs, No rubs, No gallops.   Thorax & Lungs: Normal breath sounds, No respiratory distress, No wheezing.  Abdomen: Soft, No tenderness, Bowel sounds normal.  Genital: deferred  Skin: Warm, Dry, No erythema, No rash.   Extremities: No edema, No tenderness.       ASSESSMENT/PLAN    Finesse was seen today for establish care.    Diagnoses and all orders for this visit:    Visit for [facility] health check  -     CBC Auto Differential; Future  -     Comprehensive Metabolic Panel; Future  -     Lipid Panel; Future  -     Urinalysis; Future  -     PSA, Screening; Future  -     Hemoglobin A1C; Future  -     Hepatitis C Antibody; Future    Encounter for hepatitis C screening test for low risk patient  -     Hepatitis C Antibody; Future    Seasonal allergies    Mild intermittent extrinsic asthma without complication    Primary snoring  - "     Ambulatory referral/consult to Sleep Disorders; Future    Prediabetes    Gastroesophageal reflux disease, unspecified whether esophagitis present  -     pantoprazole (PROTONIX) 40 MG tablet; Take 1 tablet (40 mg total) by mouth once daily.         FOLLOW UP: 3 months or sooner if needed  Vomiting at night likely from acid production secondary to sleep apnea      Ventura Pantoja MD

## 2022-08-03 ENCOUNTER — PATIENT MESSAGE (OUTPATIENT)
Dept: BARIATRICS | Facility: CLINIC | Age: 42
End: 2022-08-03
Payer: COMMERCIAL

## 2022-09-06 RX ORDER — SEMAGLUTIDE 1.34 MG/ML
1 INJECTION, SOLUTION SUBCUTANEOUS
Qty: 3 PEN | Refills: 0 | Status: SHIPPED | OUTPATIENT
Start: 2022-09-06 | End: 2022-11-10 | Stop reason: SDUPTHER

## 2022-09-26 ENCOUNTER — OFFICE VISIT (OUTPATIENT)
Dept: PULMONOLOGY | Facility: CLINIC | Age: 42
End: 2022-09-26
Payer: COMMERCIAL

## 2022-09-26 VITALS
OXYGEN SATURATION: 94 % | DIASTOLIC BLOOD PRESSURE: 85 MMHG | HEART RATE: 105 BPM | BODY MASS INDEX: 46.65 KG/M2 | SYSTOLIC BLOOD PRESSURE: 136 MMHG | HEIGHT: 69 IN | WEIGHT: 315 LBS

## 2022-09-26 DIAGNOSIS — E66.01 CLASS 3 SEVERE OBESITY DUE TO EXCESS CALORIES WITH SERIOUS COMORBIDITY AND BODY MASS INDEX (BMI) OF 50.0 TO 59.9 IN ADULT: ICD-10-CM

## 2022-09-26 DIAGNOSIS — G47.33 OSA (OBSTRUCTIVE SLEEP APNEA): Primary | ICD-10-CM

## 2022-09-26 DIAGNOSIS — R09.81 NASAL CONGESTION: ICD-10-CM

## 2022-09-26 DIAGNOSIS — R06.83 PRIMARY SNORING: ICD-10-CM

## 2022-09-26 PROBLEM — E66.09 OBESITY DUE TO EXCESS CALORIES: Status: ACTIVE | Noted: 2022-09-26

## 2022-09-26 PROCEDURE — 3008F BODY MASS INDEX DOCD: CPT | Mod: CPTII,S$GLB,, | Performed by: INTERNAL MEDICINE

## 2022-09-26 PROCEDURE — 1159F MED LIST DOCD IN RCRD: CPT | Mod: CPTII,S$GLB,, | Performed by: INTERNAL MEDICINE

## 2022-09-26 PROCEDURE — 3075F PR MOST RECENT SYSTOLIC BLOOD PRESS GE 130-139MM HG: ICD-10-PCS | Mod: CPTII,S$GLB,, | Performed by: INTERNAL MEDICINE

## 2022-09-26 PROCEDURE — 3079F DIAST BP 80-89 MM HG: CPT | Mod: CPTII,S$GLB,, | Performed by: INTERNAL MEDICINE

## 2022-09-26 PROCEDURE — 99203 OFFICE O/P NEW LOW 30 MIN: CPT | Mod: S$GLB,,, | Performed by: INTERNAL MEDICINE

## 2022-09-26 PROCEDURE — 3008F PR BODY MASS INDEX (BMI) DOCUMENTED: ICD-10-PCS | Mod: CPTII,S$GLB,, | Performed by: INTERNAL MEDICINE

## 2022-09-26 PROCEDURE — 99999 PR PBB SHADOW E&M-EST. PATIENT-LVL IV: ICD-10-PCS | Mod: PBBFAC,,, | Performed by: INTERNAL MEDICINE

## 2022-09-26 PROCEDURE — 3075F SYST BP GE 130 - 139MM HG: CPT | Mod: CPTII,S$GLB,, | Performed by: INTERNAL MEDICINE

## 2022-09-26 PROCEDURE — 99999 PR PBB SHADOW E&M-EST. PATIENT-LVL IV: CPT | Mod: PBBFAC,,, | Performed by: INTERNAL MEDICINE

## 2022-09-26 PROCEDURE — 1159F PR MEDICATION LIST DOCUMENTED IN MEDICAL RECORD: ICD-10-PCS | Mod: CPTII,S$GLB,, | Performed by: INTERNAL MEDICINE

## 2022-09-26 PROCEDURE — 99203 PR OFFICE/OUTPT VISIT, NEW, LEVL III, 30-44 MIN: ICD-10-PCS | Mod: S$GLB,,, | Performed by: INTERNAL MEDICINE

## 2022-09-26 PROCEDURE — 3079F PR MOST RECENT DIASTOLIC BLOOD PRESSURE 80-89 MM HG: ICD-10-PCS | Mod: CPTII,S$GLB,, | Performed by: INTERNAL MEDICINE

## 2022-09-26 NOTE — PATIENT INSTRUCTIONS
1.  NeilMed Sinus Rinse Regular Kit    Recipe 1/4 teaspoon of salt and 1/4 teaspoon of baking soda in distilled water.  Be sure to tilt head forward as shown in picture.          flonase or nasonex over the counter.  2 sprays per nostril daily. Be sure to tilt head forward when dispensing medication.

## 2022-09-26 NOTE — ASSESSMENT & PLAN NOTE
- The patient symptomatically has loud snoring, restless sleep, witnessed apnea with findings of elevated bmi. This warrants further investigation for possible obstructive sleep apnea.  Patient will be contacted after sleep study is done.

## 2022-09-26 NOTE — PROGRESS NOTES
Finesse Irvin  was seen as a new patient at the request of  Ventura Pantoja MD for the evaluation of  radha.    CHIEF COMPLAINT:    Chief Complaint   Patient presents with    Apnea       HISTORY OF PRESENT ILLNESS: Finesse Irvin is a 42 y.o. male is here for sleep evaluation.   Patient with loud snoring.  +witnessed apnea during sleep by girlfriend.  Feeling tire upon awake 3-4 times per week.  No parasomnia. No rls symptoms.  No cataplexy.      [place] Sleepiness Scale score during initial sleep evaluation was 0.    SLEEP ROUTINE:  Activity the hour prior to sleep: video    Bed partner:  girlfriend  Time to bed:  11;30 pm   Lights off:  off  Sleep onset latency:  30 minutes        Disruptions or awakenings:    1 time (no difficulty going back to sleep)    Wakeup time:      4 am   Perceived sleep quality:  tire       Daytime naps:      none  Weekend sleep routine:      12 am till 10 am   Caffeine use: 1 cup of coffee per day   exercise habit:   none      PAST MEDICAL HISTORY:    Active Ambulatory Problems     Diagnosis Date Noted    Vitamin D deficiency 06/29/2022    Seasonal allergies 06/30/2022    Extrinsic asthma without complication 06/30/2022    Prediabetes 06/30/2022    Obesity due to excess calories 09/26/2022    RADHA (obstructive sleep apnea) 09/26/2022    Nasal congestion 09/26/2022     Resolved Ambulatory Problems     Diagnosis Date Noted    No Resolved Ambulatory Problems     No Additional Past Medical History                PAST SURGICAL HISTORY:    History reviewed. No pertinent surgical history.      FAMILY HISTORY:                Family History   Problem Relation Age of Onset    Stroke Mother     Hypertension Mother     Diabetes Mother     Cancer Mother     Asthma Mother        SOCIAL HISTORY:          Tobacco:   Social History     Tobacco Use   Smoking Status Every Day    Types: Vaping with nicotine   Smokeless Tobacco Current       alcohol use:    Social History     Substance and Sexual Activity  "  Alcohol Use Not Currently                 Occupation:     ALLERGIES:  Review of patient's allergies indicates:  No Known Allergies    CURRENT MEDICATIONS:    Current Outpatient Medications   Medication Sig Dispense Refill    ergocalciferol (ERGOCALCIFEROL) 50,000 unit Cap Take 1 capsule (50,000 Units total) by mouth once a week. 12 capsule 0    pantoprazole (PROTONIX) 40 MG tablet Take 1 tablet (40 mg total) by mouth once daily. 30 tablet 1    semaglutide (OZEMPIC) 1 mg/dose (4 mg/3 mL) Inject 1 mg into the skin every 7 days. for 12 doses 3 pen 0     No current facility-administered medications for this visit.                  REVIEW OF SYSTEMS:     Sleep related symptoms as per HPI.  CONST:Denies weight gain; loosing weight with exercise and ozempic    HEENT: + sinus congestion  PULM: Denies dyspnea  CARD:  Denies palpitations   GI:  Denies acid reflux  : Denies polyuria  NEURO: Denies headaches  PSYCH: Denies mood disturbance  HEME: Denies anemia   Otherwise, a balance of systems reviewed is negative.          PHYSICAL EXAM:  Vitals:    09/26/22 1021   BP: 136/85   Pulse: 105   SpO2: (!) 94%   Weight: (!) 154.4 kg (340 lb 6.2 oz)   Height: 5' 9" (1.753 m)   PainSc: 0-No pain     Body mass index is 50.27 kg/m².     GENERAL: Normal development, well groomed  HEENT:  Conjunctivae are non-erythematous; Pupils equal, round, and reactive to light; Nose is symmetrical; Nasal mucosa is pink and moist; Septum is midline; Inferior turbinates are normal; Nasal airflow is normal; Posterior pharynx is pink; Modified Mallampati: 4; Posterior palate is normal; Tonsils +1; Uvula is normal and pink;Tongue is normal; Dentition is fair; No TMJ tenderness; Jaw opening and protrusion without click and without discomfort.  NECK: Supple. Neck circumference is 19 inches. No thyromegaly. No palpable nodes.     SKIN: On face and neck: No abrasions, no rashes, no lesions.  No subcutaneous nodules are palpable.  RESPIRATORY: " Chest is clear to auscultation.  Normal chest expansion and non-labored breathing at rest.  CARDIOVASCULAR: Normal S1, S2.  No murmurs, gallops or rubs. No carotid bruits bilaterally.  EXTREMITIES: No edema. No clubbing. No cyanosis. Station normal. Gait normal.        NEURO/PSYCH: Oriented to time, place and person. Normal attention span and concentration. Affect is full. Mood is normal.                                              DATA no prior sleep study    Lab Results   Component Value Date    TSH 1.450 12/07/2021       ASSESSMENT/PLAN    Problem List Items Addressed This Visit       Nasal congestion    Overview     - sinus irrigation and nasal steroid.           Obesity due to excess calories    Overview     -loosing weight with ozempic  -followed by Dr. Cherry         RADHA (obstructive sleep apnea) - Primary    Current Assessment & Plan     - The patient symptomatically has loud snoring, restless sleep, witnessed apnea with findings of elevated bmi. This warrants further investigation for possible obstructive sleep apnea.  Patient will be contacted after sleep study is done.          Relevant Orders    Home Sleep Studies     Other Visit Diagnoses       Primary snoring                Diagnostic: Polysomnogram. The nature of this procedure and its indication was discussed with the patient.     Education: During our discussion today, we talked about the etiology of obstructive sleep apnea as well as the potential ramifications of untreated sleep apnea, which could include daytime sleepiness, hypertension, heart disease and/or stroke.     Precautions: The patient was advised to abstain from driving should they feel sleepy or drowsy.       Thank you for allowing me the opportunity to participate in the care of your patient.    Patient will No follow-ups on file. with md/np.    Please cc note to  Ventura Pantoja MD.    30 minutes of total time spent on the encounter, which includes face to face time and  non-face to face time preparing to see the patient (eg, review of tests), Obtaining and/or reviewing separately obtained history, documenting clinical information in the electronic or other health record, independently interpreting results (not separately reported) and communicating results to the patient/family/caregiver, or Care coordination (not separately reported).

## 2022-09-30 ENCOUNTER — TELEPHONE (OUTPATIENT)
Dept: FAMILY MEDICINE | Facility: CLINIC | Age: 42
End: 2022-09-30
Payer: COMMERCIAL

## 2022-10-06 ENCOUNTER — PATIENT MESSAGE (OUTPATIENT)
Dept: BARIATRICS | Facility: CLINIC | Age: 42
End: 2022-10-06
Payer: COMMERCIAL

## 2022-10-06 ENCOUNTER — TELEPHONE (OUTPATIENT)
Dept: SLEEP MEDICINE | Facility: HOSPITAL | Age: 42
End: 2022-10-06
Payer: COMMERCIAL

## 2022-10-20 ENCOUNTER — HOSPITAL ENCOUNTER (OUTPATIENT)
Dept: SLEEP MEDICINE | Facility: HOSPITAL | Age: 42
Discharge: HOME | End: 2022-10-20
Attending: INTERNAL MEDICINE
Payer: COMMERCIAL

## 2022-10-20 DIAGNOSIS — G47.33 OSA (OBSTRUCTIVE SLEEP APNEA): ICD-10-CM

## 2022-10-20 PROCEDURE — 95806 SLEEP STUDY UNATT&RESP EFFT: CPT | Mod: 26,,, | Performed by: INTERNAL MEDICINE

## 2022-10-20 PROCEDURE — 95800 SLP STDY UNATTENDED: CPT

## 2022-10-20 PROCEDURE — 95806 PR SLEEP STUDY, UNATTENDED, SIMUL RECORD HR/O2 SAT/RESP FLOW/RESP EFFT: ICD-10-PCS | Mod: 26,,, | Performed by: INTERNAL MEDICINE

## 2022-10-20 NOTE — PROGRESS NOTES
The patient ID was verified. He was instructed on how to turn the Home Sleep Testing device on and off, how to apply the sensors. He was encouraged to sleep on supine position and must have 6 hours of sleep. The patient was instructed not to get the device wet and return it back to us. All questions were answered prior to patient leaving. He was provided the after visit summary.

## 2022-10-21 NOTE — PROGRESS NOTES
The HSAT device was received and uploaded this morning. The recorded sleep data noted to be adequate and the patient did not report issue with the device during the study. The ESS form was returned, but was not completed as instructed.

## 2022-10-24 NOTE — PROCEDURES
"Dear Provider,     You have ordered sleep LAB services to perform the sleep study for Finesse Irvin.  The sleep study that you ordered is complete.      Please find Sleep Study result in "Chart Review" under the "Media tab."      As the ordering provider, you are responsible for reviewing the results and implementing a treatment plan with your patient.    If you need a Sleep Medicine provider to explain the sleep study findings and arrange treatment for the patient, please refer patient for consultation to our Sleep Clinic via [facility] with Ambulatory Consult Sleep.    To do that please place an order for an  "Ambulatory Consult Sleep" - it will go to our clinic work queue for our Medical Assistant to contact the patient for an appointment.     For any questions, please contact our clinic staff at [phone] to talk to clinical staff.   "

## 2022-10-25 ENCOUNTER — TELEPHONE (OUTPATIENT)
Dept: PULMONOLOGY | Facility: CLINIC | Age: 42
End: 2022-10-25
Payer: COMMERCIAL

## 2022-10-25 NOTE — TELEPHONE ENCOUNTER
Spoke with patient scheduled an appointment to see provider.    TERENCE Kauffman                     ----- Message from Michi Toribio MD sent at 10/24/2022 10:38 AM CDT -----  Please schedule sleep clinic appointmetnt with md/np in 1-2 weeks to discuss sleep study result.  Please notify me if we are not able to get patient schedule within 2 weeks.

## 2022-11-01 ENCOUNTER — OFFICE VISIT (OUTPATIENT)
Dept: PULMONOLOGY | Facility: CLINIC | Age: 42
End: 2022-11-01
Payer: COMMERCIAL

## 2022-11-01 VITALS
DIASTOLIC BLOOD PRESSURE: 91 MMHG | OXYGEN SATURATION: 97 % | BODY MASS INDEX: 46.65 KG/M2 | WEIGHT: 315 LBS | SYSTOLIC BLOOD PRESSURE: 158 MMHG | HEART RATE: 97 BPM | HEIGHT: 69 IN

## 2022-11-01 DIAGNOSIS — E66.01 CLASS 3 SEVERE OBESITY DUE TO EXCESS CALORIES WITH SERIOUS COMORBIDITY AND BODY MASS INDEX (BMI) OF 50.0 TO 59.9 IN ADULT: ICD-10-CM

## 2022-11-01 DIAGNOSIS — G47.33 OSA (OBSTRUCTIVE SLEEP APNEA): Primary | ICD-10-CM

## 2022-11-01 DIAGNOSIS — R09.81 NASAL CONGESTION: ICD-10-CM

## 2022-11-01 PROCEDURE — 99999 PR PBB SHADOW E&M-EST. PATIENT-LVL III: ICD-10-PCS | Mod: PBBFAC,,, | Performed by: INTERNAL MEDICINE

## 2022-11-01 PROCEDURE — 99214 OFFICE O/P EST MOD 30 MIN: CPT | Mod: S$GLB,,, | Performed by: INTERNAL MEDICINE

## 2022-11-01 PROCEDURE — 3077F SYST BP >= 140 MM HG: CPT | Mod: CPTII,S$GLB,, | Performed by: INTERNAL MEDICINE

## 2022-11-01 PROCEDURE — 3008F BODY MASS INDEX DOCD: CPT | Mod: CPTII,S$GLB,, | Performed by: INTERNAL MEDICINE

## 2022-11-01 PROCEDURE — 99999 PR PBB SHADOW E&M-EST. PATIENT-LVL III: CPT | Mod: PBBFAC,,, | Performed by: INTERNAL MEDICINE

## 2022-11-01 PROCEDURE — 1159F PR MEDICATION LIST DOCUMENTED IN MEDICAL RECORD: ICD-10-PCS | Mod: CPTII,S$GLB,, | Performed by: INTERNAL MEDICINE

## 2022-11-01 PROCEDURE — 99214 PR OFFICE/OUTPT VISIT, EST, LEVL IV, 30-39 MIN: ICD-10-PCS | Mod: S$GLB,,, | Performed by: INTERNAL MEDICINE

## 2022-11-01 PROCEDURE — 3080F PR MOST RECENT DIASTOLIC BLOOD PRESSURE >= 90 MM HG: ICD-10-PCS | Mod: CPTII,S$GLB,, | Performed by: INTERNAL MEDICINE

## 2022-11-01 PROCEDURE — 3077F PR MOST RECENT SYSTOLIC BLOOD PRESSURE >= 140 MM HG: ICD-10-PCS | Mod: CPTII,S$GLB,, | Performed by: INTERNAL MEDICINE

## 2022-11-01 PROCEDURE — 3008F PR BODY MASS INDEX (BMI) DOCUMENTED: ICD-10-PCS | Mod: CPTII,S$GLB,, | Performed by: INTERNAL MEDICINE

## 2022-11-01 PROCEDURE — 1159F MED LIST DOCD IN RCRD: CPT | Mod: CPTII,S$GLB,, | Performed by: INTERNAL MEDICINE

## 2022-11-01 PROCEDURE — 3080F DIAST BP >= 90 MM HG: CPT | Mod: CPTII,S$GLB,, | Performed by: INTERNAL MEDICINE

## 2022-11-01 NOTE — PROGRESS NOTES
Finesse Irvin  was seen as a follow up.    CHIEF COMPLAINT:    Chief Complaint   Patient presents with    Results       HISTORY OF PRESENT ILLNESS: Finesse Irvin is a 42 y.o. male is here for sleep evaluation.   Our first encounter was 9/26/22.  Patient with loud snoring.  +witnessed apnea during sleep by girlfriend.  Feeling tire upon awake 3-4 times per week.  No parasomnia. No rls symptoms.  No cataplexy.      [place] Sleepiness Scale score during initial sleep evaluation was 0.    S/p hsat 10/20/22.  No changes since last visit.  Sinus congestion improve.      SLEEP ROUTINE:  Activity the hour prior to sleep: video    Bed partner:  girlfriend  Time to bed:  11:30 pm   Lights off:  off  Sleep onset latency:  30 minutes        Disruptions or awakenings:    1 time (no difficulty going back to sleep)    Wakeup time:      4 am   Perceived sleep quality:  tire       Daytime naps:      none  Weekend sleep routine:      12 am till 10 am   Caffeine use: 1 cup of coffee per day   exercise habit:   none      PAST MEDICAL HISTORY:    Active Ambulatory Problems     Diagnosis Date Noted    Vitamin D deficiency 06/29/2022    Seasonal allergies 06/30/2022    Extrinsic asthma without complication 06/30/2022    Prediabetes 06/30/2022    Obesity due to excess calories 09/26/2022    RADHA (obstructive sleep apnea) 09/26/2022    Nasal congestion 09/26/2022     Resolved Ambulatory Problems     Diagnosis Date Noted    No Resolved Ambulatory Problems     No Additional Past Medical History                PAST SURGICAL HISTORY:    History reviewed. No pertinent surgical history.      FAMILY HISTORY:                Family History   Problem Relation Age of Onset    Stroke Mother     Hypertension Mother     Diabetes Mother     Cancer Mother     Asthma Mother        SOCIAL HISTORY:          Tobacco:   Social History     Tobacco Use   Smoking Status Every Day    Types: Vaping with nicotine   Smokeless Tobacco Current       alcohol use:   "  Social History     Substance and Sexual Activity   Alcohol Use Not Currently                 Occupation:     ALLERGIES:  Review of patient's allergies indicates:  No Known Allergies    CURRENT MEDICATIONS:    Current Outpatient Medications   Medication Sig Dispense Refill    pantoprazole (PROTONIX) 40 MG tablet Take 1 tablet (40 mg total) by mouth once daily. 30 tablet 1    semaglutide (OZEMPIC) 1 mg/dose (4 mg/3 mL) Inject 1 mg into the skin every 7 days. for 12 doses 3 pen 0     No current facility-administered medications for this visit.                  REVIEW OF SYSTEMS:     No acute changes from previous encounter dated 9/26/2022 with exceptions mentioned in HPI.         PHYSICAL EXAM:  Vitals:    11/01/22 1123   BP: (!) 158/91   Pulse: 97   SpO2: 97%   Weight: (!) 153.4 kg (338 lb 4.7 oz)   Height: 5' 9" (1.753 m)   PainSc: 0-No pain     Body mass index is 49.96 kg/m².     GENERAL: Normal development, well groomed  HEENT:  Conjunctivae are non-erythematous; Pupils equal, round, and reactive to light; Nose is symmetrical; Nasal mucosa is pink and moist; Septum is midline; Inferior turbinates are normal; Nasal airflow is normal; Posterior pharynx is pink; Modified Mallampati: 4; Posterior palate is normal; Tonsils +1; Uvula is normal and pink;Tongue is normal; Dentition is fair; No TMJ tenderness; Jaw opening and protrusion without click and without discomfort.  NECK: Supple. Neck circumference is 19 inches. No thyromegaly. No palpable nodes.     SKIN: On face and neck: No abrasions, no rashes, no lesions.  No subcutaneous nodules are palpable.  RESPIRATORY: Chest is clear to auscultation.  Normal chest expansion and non-labored breathing at rest.  CARDIOVASCULAR: Normal S1, S2.  No murmurs, gallops or rubs. No carotid bruits bilaterally.  EXTREMITIES: No edema. No clubbing. No cyanosis. Station normal. Gait normal.        NEURO/PSYCH: Oriented to time, place and person. Normal attention span and " concentration. Affect is full. Mood is normal.                                              DATA   10/20/22 ahi of 35; %with sat <90 was 33    Lab Results   Component Value Date    TSH 1.450 12/07/2021       ASSESSMENT/PLAN    Problem List Items Addressed This Visit       Nasal congestion    Overview     - improve with sinus irrigation.           Obesity due to excess calories    Overview     -loosing weight with ozempic  -followed by Dr. Cherry.           RADHA (obstructive sleep apnea) - Primary    Overview     -ahi of 35 with severe desaturation  -result d/w patient. Recommend pap therapy. Severe desaturation suggestive of hypoventilation.  In lab titration is recommended.           Relevant Orders    CPAP Titration (Must have dx of RADHA from previous sleep study.)           Education: During our discussion today, we talked about the etiology of obstructive sleep apnea as well as the potential ramifications of untreated sleep apnea, which could include daytime sleepiness, hypertension, heart disease and/or stroke.     Precautions: The patient was advised to abstain from driving should they feel sleepy or drowsy.         Patient will No follow-ups on file. with md/np.      25 minutes of total time spent on the encounter, which includes face to face time and non-face to face time preparing to see the patient (eg, review of tests), Obtaining and/or reviewing separately obtained history, documenting clinical information in the electronic or other health record, independently interpreting results (not separately reported) and communicating results to the patient/family/caregiver, or Care coordination (not separately reported).

## 2022-11-07 ENCOUNTER — TELEPHONE (OUTPATIENT)
Dept: BARIATRICS | Facility: CLINIC | Age: 42
End: 2022-11-07
Payer: COMMERCIAL

## 2022-11-10 ENCOUNTER — OFFICE VISIT (OUTPATIENT)
Dept: BARIATRICS | Facility: CLINIC | Age: 42
End: 2022-11-10
Payer: COMMERCIAL

## 2022-11-10 VITALS
HEART RATE: 81 BPM | SYSTOLIC BLOOD PRESSURE: 128 MMHG | OXYGEN SATURATION: 99 % | HEIGHT: 69 IN | DIASTOLIC BLOOD PRESSURE: 76 MMHG | WEIGHT: 315 LBS | BODY MASS INDEX: 46.65 KG/M2

## 2022-11-10 DIAGNOSIS — G47.33 OSA (OBSTRUCTIVE SLEEP APNEA): ICD-10-CM

## 2022-11-10 DIAGNOSIS — R73.03 PREDIABETES: ICD-10-CM

## 2022-11-10 DIAGNOSIS — E66.01 CLASS 3 SEVERE OBESITY DUE TO EXCESS CALORIES WITH SERIOUS COMORBIDITY AND BODY MASS INDEX (BMI) OF 45.0 TO 49.9 IN ADULT: Primary | ICD-10-CM

## 2022-11-10 DIAGNOSIS — Z71.3 ENCOUNTER FOR WEIGHT LOSS COUNSELING: ICD-10-CM

## 2022-11-10 PROCEDURE — 99213 OFFICE O/P EST LOW 20 MIN: CPT | Mod: S$GLB,,, | Performed by: STUDENT IN AN ORGANIZED HEALTH CARE EDUCATION/TRAINING PROGRAM

## 2022-11-10 PROCEDURE — 1160F RVW MEDS BY RX/DR IN RCRD: CPT | Mod: CPTII,S$GLB,, | Performed by: STUDENT IN AN ORGANIZED HEALTH CARE EDUCATION/TRAINING PROGRAM

## 2022-11-10 PROCEDURE — 1159F PR MEDICATION LIST DOCUMENTED IN MEDICAL RECORD: ICD-10-PCS | Mod: CPTII,S$GLB,, | Performed by: STUDENT IN AN ORGANIZED HEALTH CARE EDUCATION/TRAINING PROGRAM

## 2022-11-10 PROCEDURE — 1159F MED LIST DOCD IN RCRD: CPT | Mod: CPTII,S$GLB,, | Performed by: STUDENT IN AN ORGANIZED HEALTH CARE EDUCATION/TRAINING PROGRAM

## 2022-11-10 PROCEDURE — 3074F PR MOST RECENT SYSTOLIC BLOOD PRESSURE < 130 MM HG: ICD-10-PCS | Mod: CPTII,S$GLB,, | Performed by: STUDENT IN AN ORGANIZED HEALTH CARE EDUCATION/TRAINING PROGRAM

## 2022-11-10 PROCEDURE — 3008F BODY MASS INDEX DOCD: CPT | Mod: CPTII,S$GLB,, | Performed by: STUDENT IN AN ORGANIZED HEALTH CARE EDUCATION/TRAINING PROGRAM

## 2022-11-10 PROCEDURE — 3074F SYST BP LT 130 MM HG: CPT | Mod: CPTII,S$GLB,, | Performed by: STUDENT IN AN ORGANIZED HEALTH CARE EDUCATION/TRAINING PROGRAM

## 2022-11-10 PROCEDURE — 99213 PR OFFICE/OUTPT VISIT, EST, LEVL III, 20-29 MIN: ICD-10-PCS | Mod: S$GLB,,, | Performed by: STUDENT IN AN ORGANIZED HEALTH CARE EDUCATION/TRAINING PROGRAM

## 2022-11-10 PROCEDURE — 99999 PR PBB SHADOW E&M-EST. PATIENT-LVL III: ICD-10-PCS | Mod: PBBFAC,,, | Performed by: STUDENT IN AN ORGANIZED HEALTH CARE EDUCATION/TRAINING PROGRAM

## 2022-11-10 PROCEDURE — 3008F PR BODY MASS INDEX (BMI) DOCUMENTED: ICD-10-PCS | Mod: CPTII,S$GLB,, | Performed by: STUDENT IN AN ORGANIZED HEALTH CARE EDUCATION/TRAINING PROGRAM

## 2022-11-10 PROCEDURE — 1160F PR REVIEW ALL MEDS BY PRESCRIBER/CLIN PHARMACIST DOCUMENTED: ICD-10-PCS | Mod: CPTII,S$GLB,, | Performed by: STUDENT IN AN ORGANIZED HEALTH CARE EDUCATION/TRAINING PROGRAM

## 2022-11-10 PROCEDURE — 3078F DIAST BP <80 MM HG: CPT | Mod: CPTII,S$GLB,, | Performed by: STUDENT IN AN ORGANIZED HEALTH CARE EDUCATION/TRAINING PROGRAM

## 2022-11-10 PROCEDURE — 99999 PR PBB SHADOW E&M-EST. PATIENT-LVL III: CPT | Mod: PBBFAC,,, | Performed by: STUDENT IN AN ORGANIZED HEALTH CARE EDUCATION/TRAINING PROGRAM

## 2022-11-10 PROCEDURE — 3078F PR MOST RECENT DIASTOLIC BLOOD PRESSURE < 80 MM HG: ICD-10-PCS | Mod: CPTII,S$GLB,, | Performed by: STUDENT IN AN ORGANIZED HEALTH CARE EDUCATION/TRAINING PROGRAM

## 2022-11-10 RX ORDER — SEMAGLUTIDE 1.34 MG/ML
1 INJECTION, SOLUTION SUBCUTANEOUS
Qty: 3 PEN | Refills: 0 | Status: SHIPPED | OUTPATIENT
Start: 2022-11-10 | End: 2023-01-25 | Stop reason: SDUPTHER

## 2022-11-10 NOTE — PROGRESS NOTES
Subjective:       Patient ID: Finesse Irvin is a 42 y.o. male.    Chief Complaint: Follow-up, Obesity, and Weight Check    Patient presents for treatment of obesity.     Has gained 100 lbs since pandemic    Co-morbidities:   Asthma  Prediabetes  Vitamin D Deficiency    Negative for pancreatitis  Negative for thyroid cancer     History of Weight Loss Efforts  Adipex earlier this year - stopped taking because made him feel jittery    Current Physical Activity  No regular exercise   for work    Current Eating Habits  Breakfast - skips  Lunch - fast food  Dinner - fast food; cooks about 1x/week  Snacks - chips  Beverages - powerade, water    Medical Weight Loss  12/7/2021: 360.9 lbs, BMI 47.6, BFP 40.8%, .1 lbs, BMR 2463 kcal; Ozempic  6/28/2022: 345.3 lbs, BMI 45.6, BFP 40.9%, .1 lbs, BMR 2368 kcal; Ozempic  11/10/2022: 337.3 lbs, BMI 44.5, BFP 38.7%, .5, .6 lbs, BMR 2395 kcal; Ozempic    Follow-up  Pertinent negatives include no abdominal pain, chest pain, chills, fever, nausea or vomiting.   Review of Systems   Constitutional:  Negative for chills and fever.   Respiratory:  Negative for shortness of breath.    Cardiovascular:  Negative for chest pain, palpitations and leg swelling.   Gastrointestinal:  Negative for abdominal pain, nausea and vomiting.   Neurological:  Negative for dizziness and light-headedness.   Psychiatric/Behavioral:  The patient is not nervous/anxious.        Objective:     Results for FINESSE IRVIN (MRN [id number]) as of 12/8/2021 14:21   Ref. Range 12/7/2021 09:25   WBC Latest Ref Range: 3.90 - 12.70 K/uL 8.63   RBC Latest Ref Range: 4.60 - 6.20 M/uL 5.44   Hemoglobin Latest Ref Range: 14.0 - 18.0 g/dL 11.8 (L)   Hematocrit Latest Ref Range: 40.0 - 54.0 % 39.9 (L)   MCV Latest Ref Range: 82 - 98 fL 73 (L)   MCH Latest Ref Range: 27.0 - 31.0 pg 21.7 (L)   MCHC Latest Ref Range: 32.0 - 36.0 g/dL 29.6 (L)   RDW Latest Ref Range: 11.5 - 14.5 % 17.4 (H)    Platelets Latest Ref Range: 150 - 450 K/uL 332   MPV Latest Ref Range: 9.2 - 12.9 fL 10.9   Gran % Latest Ref Range: 38.0 - 73.0 % 43.3   Lymph % Latest Ref Range: 18.0 - 48.0 % 39.9   Mono % Latest Ref Range: 4.0 - 15.0 % 8.9   Eosinophil % Latest Ref Range: 0.0 - 8.0 % 6.8   Basophil % Latest Ref Range: 0.0 - 1.9 % 0.8   Immature Granulocytes Latest Ref Range: 0.0 - 0.5 % 0.3   Gran # (ANC) Latest Ref Range: 1.8 - 7.7 K/uL 3.7   Lymph # Latest Ref Range: 1.0 - 4.8 K/uL 3.4   Mono # Latest Ref Range: 0.3 - 1.0 K/uL 0.8   Eos # Latest Ref Range: 0.0 - 0.5 K/uL 0.6 (H)   Baso # Latest Ref Range: 0.00 - 0.20 K/uL 0.07   Immature Grans (Abs) Latest Ref Range: 0.00 - 0.04 K/uL 0.03   nRBC Latest Ref Range: 0 /100 WBC 0   Differential Method Unknown Automated   Sodium Latest Ref Range: 136 - 145 mmol/L 139   Potassium Latest Ref Range: 3.5 - 5.1 mmol/L 4.2   Chloride Latest Ref Range: 95 - 110 mmol/L 102   CO2 Latest Ref Range: 23 - 29 mmol/L 28   Anion Gap Latest Ref Range: 8 - 16 mmol/L 9   BUN Latest Ref Range: 6 - 20 mg/dL 11   Creatinine Latest Ref Range: 0.5 - 1.4 mg/dL 1.0   eGFR if non African American Latest Ref Range: >60 mL/min/1.73 m^2 >60.0   eGFR if African American Latest Ref Range: >60 mL/min/1.73 m^2 >60.0   Glucose Latest Ref Range: 70 - 110 mg/dL 108   Calcium Latest Ref Range: 8.7 - 10.5 mg/dL 9.1   Alkaline Phosphatase Latest Ref Range: 55 - 135 U/L 83   PROTEIN TOTAL Latest Ref Range: 6.0 - 8.4 g/dL 8.1   Albumin Latest Ref Range: 3.5 - 5.2 g/dL 3.8   BILIRUBIN TOTAL Latest Ref Range: 0.1 - 1.0 mg/dL 0.2   AST Latest Ref Range: 10 - 40 U/L 20   ALT Latest Ref Range: 10 - 44 U/L 19   Triglycerides Latest Ref Range: 30 - 150 mg/dL 89   Cholesterol Latest Ref Range: 120 - 199 mg/dL 186   HDL Latest Ref Range: 40 - 75 mg/dL 40   HDL/Cholesterol Ratio Latest Ref Range: 20.0 - 50.0 % 21.5   LDL Cholesterol External Latest Ref Range: 63.0 - 159.0 mg/dL 128.2   Non-HDL Cholesterol Latest Units: mg/dL 146    Total Cholesterol/HDL Ratio Latest Ref Range: 2.0 - 5.0  4.7   Vit D, 25-Hydroxy Latest Ref Range: 30 - 96 ng/mL 9 (L)   Hemoglobin A1C External Latest Ref Range: 4.0 - 5.6 % 6.1 (H)   Estimated Avg Glucose Latest Ref Range: 68 - 131 mg/dL 128   TSH Latest Ref Range: 0.400 - 4.000 uIU/mL 1.450         Vitals:    11/10/22 0817   BP: 128/76   Pulse: 81         Physical Exam  Vitals reviewed.   Constitutional:       General: He is not in acute distress.     Appearance: Normal appearance. He is obese. He is not ill-appearing, toxic-appearing or diaphoretic.   HENT:      Head: Normocephalic and atraumatic.   Eyes:      Extraocular Movements: Extraocular movements intact.   Cardiovascular:      Rate and Rhythm: Normal rate.   Pulmonary:      Effort: Pulmonary effort is normal. No respiratory distress.   Musculoskeletal:         General: Normal range of motion.      Cervical back: Normal range of motion.   Skin:     General: Skin is warm and dry.   Neurological:      General: No focal deficit present.      Mental Status: He is alert and oriented to person, place, and time.      Gait: Gait normal.   Psychiatric:         Mood and Affect: Mood normal.         Behavior: Behavior normal.       Assessment:       Problem List Items Addressed This Visit       Prediabetes    Obesity due to excess calories - Primary    RADHA (obstructive sleep apnea)     Other Visit Diagnoses       Encounter for weight loss counseling                Plan:   - Ozempic 1 mg weekly.     - Continue vitamin D supplements    - Log all food and beverage intake with a daily calorie goal of 2000 calories per day    - Moderate intensity aerobic exercise for 30 minutes 3-5x/week

## 2022-12-21 ENCOUNTER — HOSPITAL ENCOUNTER (OUTPATIENT)
Dept: SLEEP MEDICINE | Facility: HOSPITAL | Age: 42
Discharge: HOME | End: 2022-12-21
Attending: INTERNAL MEDICINE
Payer: COMMERCIAL

## 2022-12-21 DIAGNOSIS — G47.33 OSA (OBSTRUCTIVE SLEEP APNEA): ICD-10-CM

## 2022-12-21 PROCEDURE — 95811 POLYSOM 6/>YRS CPAP 4/> PARM: CPT

## 2022-12-22 NOTE — PROGRESS NOTES
A CPAP titration study was performed on  Finesse Irvin. The entire procedure was explained, including Bio calibration procedure, patient was informed that there may be a need to enter the room during the night to fix lead or make adjustments to the equipment. Questions were answered prior to start of study. He  was given instructions on how to call out for help including how to use the nurse call unit in the bathroom. Patient was given [employer] phone number to call if patient needed tech for anything, in case tech was out of tech room.  Patient education was performed. This includes the possible use of CPAP machine and different CPAP masks. He  was given the after visit summary.   The lowest oxygen saturation observed during sleep was 68%   Technical difficulties during the study. Run time error message during the study, tech support had to fix and restart the study. Review screen lost and could not be displayed, called [employer] and was unable to be fixed. A new study was started, studies had to be merged.   The EKG appeared to be NSR, PVC   The patient was titrated from 5 cm H2O to  18 Cm H2O.  Eson nasal mask large was used with chin strap, humidity, and EPR. The mask was changed to Simplus full face size large due to mask leak, mouth breathing, bubble blowing through the patients mouth.. The pressure of  18 cm H2O is believed to eliminate most of the events. Supine REM sleep was obtained during the study. His reaction to PAP was it was ok

## 2022-12-22 NOTE — PATIENT INSTRUCTIONS
Your sleep study will be scored and interpreted by one of our physicians who are board certified in sleep medicine.  Within two weeks the results will be sent to the physician who referred you. Your physician should then contact you to go over the results, along with any recommendations. If you do not hear from your physician within two weeks, please call them.

## 2023-01-03 ENCOUNTER — TELEPHONE (OUTPATIENT)
Dept: PULMONOLOGY | Facility: CLINIC | Age: 43
End: 2023-01-03
Payer: COMMERCIAL

## 2023-01-03 DIAGNOSIS — G47.33 OSA (OBSTRUCTIVE SLEEP APNEA): Primary | ICD-10-CM

## 2023-01-03 PROCEDURE — 95811 PR POLYSOMNOGRAPHY W/CPAP: ICD-10-PCS | Mod: 26,,, | Performed by: INTERNAL MEDICINE

## 2023-01-03 PROCEDURE — 95811 POLYSOM 6/>YRS CPAP 4/> PARM: CPT | Mod: 26,,, | Performed by: INTERNAL MEDICINE

## 2023-01-04 ENCOUNTER — TELEPHONE (OUTPATIENT)
Dept: PULMONOLOGY | Facility: CLINIC | Age: 43
End: 2023-01-04
Payer: COMMERCIAL

## 2023-01-04 NOTE — TELEPHONE ENCOUNTER
Sent patient a portal message.          ----- Message from Daly Khanna MA sent at 1/4/2023  8:04 AM CST -----  Please let patient know that the titration went well and I would like to set patient up with cpap via dme of choice.  Clinic follow up with md/np in approximately 8 weeks after cpap usage.

## 2023-01-04 NOTE — TELEPHONE ENCOUNTER
Please let patient know that the titration went well and I would like to set patient up with cpap via dme of choice.  Clinic follow up with md/np in approximately 8 weeks after cpap usage.

## 2023-01-04 NOTE — PROCEDURES
"Dear Provider,     You have ordered sleep LAB services to perform the sleep study for Finesse Irvin.  The sleep study that you ordered is complete.      Please find Sleep Study result in "Chart Review" under the "Media tab."      As the ordering provider, you are responsible for reviewing the results and implementing a treatment plan with your patient.    If you need a Sleep Medicine provider to explain the sleep study findings and arrange treatment for the patient, please refer patient for consultation to our Sleep Clinic via [facility] with Ambulatory Consult Sleep.    To do that please place an order for an  "Ambulatory Consult Sleep" - it will go to our clinic work queue for our Medical Assistant to contact the patient for an appointment.     For any questions, please contact our clinic staff at [phone] to talk to clinical staff.   "

## 2023-01-25 ENCOUNTER — OFFICE VISIT (OUTPATIENT)
Dept: BARIATRICS | Facility: CLINIC | Age: 43
End: 2023-01-25
Payer: COMMERCIAL

## 2023-01-25 VITALS
DIASTOLIC BLOOD PRESSURE: 68 MMHG | HEIGHT: 69 IN | SYSTOLIC BLOOD PRESSURE: 122 MMHG | WEIGHT: 315 LBS | HEART RATE: 82 BPM | OXYGEN SATURATION: 98 % | BODY MASS INDEX: 46.65 KG/M2

## 2023-01-25 DIAGNOSIS — E66.01 CLASS 3 SEVERE OBESITY DUE TO EXCESS CALORIES WITH SERIOUS COMORBIDITY AND BODY MASS INDEX (BMI) OF 45.0 TO 49.9 IN ADULT: Primary | ICD-10-CM

## 2023-01-25 DIAGNOSIS — R73.03 PREDIABETES: ICD-10-CM

## 2023-01-25 DIAGNOSIS — Z71.3 ENCOUNTER FOR WEIGHT LOSS COUNSELING: ICD-10-CM

## 2023-01-25 DIAGNOSIS — G47.33 OSA (OBSTRUCTIVE SLEEP APNEA): ICD-10-CM

## 2023-01-25 PROCEDURE — 99213 OFFICE O/P EST LOW 20 MIN: CPT | Mod: S$GLB,,, | Performed by: STUDENT IN AN ORGANIZED HEALTH CARE EDUCATION/TRAINING PROGRAM

## 2023-01-25 PROCEDURE — 99213 PR OFFICE/OUTPT VISIT, EST, LEVL III, 20-29 MIN: ICD-10-PCS | Mod: S$GLB,,, | Performed by: STUDENT IN AN ORGANIZED HEALTH CARE EDUCATION/TRAINING PROGRAM

## 2023-01-25 PROCEDURE — 1159F MED LIST DOCD IN RCRD: CPT | Mod: CPTII,S$GLB,, | Performed by: STUDENT IN AN ORGANIZED HEALTH CARE EDUCATION/TRAINING PROGRAM

## 2023-01-25 PROCEDURE — 3074F PR MOST RECENT SYSTOLIC BLOOD PRESSURE < 130 MM HG: ICD-10-PCS | Mod: CPTII,S$GLB,, | Performed by: STUDENT IN AN ORGANIZED HEALTH CARE EDUCATION/TRAINING PROGRAM

## 2023-01-25 PROCEDURE — 99999 PR PBB SHADOW E&M-EST. PATIENT-LVL III: CPT | Mod: PBBFAC,,, | Performed by: STUDENT IN AN ORGANIZED HEALTH CARE EDUCATION/TRAINING PROGRAM

## 2023-01-25 PROCEDURE — 3078F PR MOST RECENT DIASTOLIC BLOOD PRESSURE < 80 MM HG: ICD-10-PCS | Mod: CPTII,S$GLB,, | Performed by: STUDENT IN AN ORGANIZED HEALTH CARE EDUCATION/TRAINING PROGRAM

## 2023-01-25 PROCEDURE — 3008F PR BODY MASS INDEX (BMI) DOCUMENTED: ICD-10-PCS | Mod: CPTII,S$GLB,, | Performed by: STUDENT IN AN ORGANIZED HEALTH CARE EDUCATION/TRAINING PROGRAM

## 2023-01-25 PROCEDURE — 1160F RVW MEDS BY RX/DR IN RCRD: CPT | Mod: CPTII,S$GLB,, | Performed by: STUDENT IN AN ORGANIZED HEALTH CARE EDUCATION/TRAINING PROGRAM

## 2023-01-25 PROCEDURE — 3074F SYST BP LT 130 MM HG: CPT | Mod: CPTII,S$GLB,, | Performed by: STUDENT IN AN ORGANIZED HEALTH CARE EDUCATION/TRAINING PROGRAM

## 2023-01-25 PROCEDURE — 99999 PR PBB SHADOW E&M-EST. PATIENT-LVL III: ICD-10-PCS | Mod: PBBFAC,,, | Performed by: STUDENT IN AN ORGANIZED HEALTH CARE EDUCATION/TRAINING PROGRAM

## 2023-01-25 PROCEDURE — 3008F BODY MASS INDEX DOCD: CPT | Mod: CPTII,S$GLB,, | Performed by: STUDENT IN AN ORGANIZED HEALTH CARE EDUCATION/TRAINING PROGRAM

## 2023-01-25 PROCEDURE — 1159F PR MEDICATION LIST DOCUMENTED IN MEDICAL RECORD: ICD-10-PCS | Mod: CPTII,S$GLB,, | Performed by: STUDENT IN AN ORGANIZED HEALTH CARE EDUCATION/TRAINING PROGRAM

## 2023-01-25 PROCEDURE — 1160F PR REVIEW ALL MEDS BY PRESCRIBER/CLIN PHARMACIST DOCUMENTED: ICD-10-PCS | Mod: CPTII,S$GLB,, | Performed by: STUDENT IN AN ORGANIZED HEALTH CARE EDUCATION/TRAINING PROGRAM

## 2023-01-25 PROCEDURE — 3078F DIAST BP <80 MM HG: CPT | Mod: CPTII,S$GLB,, | Performed by: STUDENT IN AN ORGANIZED HEALTH CARE EDUCATION/TRAINING PROGRAM

## 2023-01-25 RX ORDER — SEMAGLUTIDE 1.34 MG/ML
1 INJECTION, SOLUTION SUBCUTANEOUS
Qty: 3 PEN | Refills: 0 | Status: SHIPPED | OUTPATIENT
Start: 2023-01-25 | End: 2023-06-15

## 2023-02-10 ENCOUNTER — TELEPHONE (OUTPATIENT)
Dept: FAMILY MEDICINE | Facility: CLINIC | Age: 43
End: 2023-02-10
Payer: COMMERCIAL

## 2023-02-11 ENCOUNTER — LAB VISIT (OUTPATIENT)
Dept: LAB | Facility: HOSPITAL | Age: 43
End: 2023-02-11
Attending: FAMILY MEDICINE
Payer: COMMERCIAL

## 2023-02-11 DIAGNOSIS — Z00.00 VISIT FOR WELL MAN HEALTH CHECK: ICD-10-CM

## 2023-02-11 DIAGNOSIS — Z11.59 ENCOUNTER FOR HEPATITIS C SCREENING TEST FOR LOW RISK PATIENT: ICD-10-CM

## 2023-02-11 LAB
ALBUMIN SERPL BCP-MCNC: 3.9 G/DL (ref 3.5–5.2)
ALP SERPL-CCNC: 73 U/L (ref 55–135)
ALT SERPL W/O P-5'-P-CCNC: 15 U/L (ref 10–44)
ANION GAP SERPL CALC-SCNC: 12 MMOL/L (ref 8–16)
AST SERPL-CCNC: 23 U/L (ref 10–40)
BASOPHILS # BLD AUTO: 0.07 K/UL (ref 0–0.2)
BASOPHILS NFR BLD: 0.7 % (ref 0–1.9)
BILIRUB SERPL-MCNC: 0.3 MG/DL (ref 0.1–1)
BUN SERPL-MCNC: 10 MG/DL (ref 6–20)
CALCIUM SERPL-MCNC: 9.5 MG/DL (ref 8.7–10.5)
CHLORIDE SERPL-SCNC: 103 MMOL/L (ref 95–110)
CHOLEST SERPL-MCNC: 170 MG/DL (ref 120–199)
CHOLEST/HDLC SERPL: 4.6 {RATIO} (ref 2–5)
CO2 SERPL-SCNC: 24 MMOL/L (ref 23–29)
COMPLEXED PSA SERPL-MCNC: 0.59 NG/ML (ref 0–4)
CREAT SERPL-MCNC: 1.1 MG/DL (ref 0.5–1.4)
DIFFERENTIAL METHOD: ABNORMAL
EOSINOPHIL # BLD AUTO: 0.4 K/UL (ref 0–0.5)
EOSINOPHIL NFR BLD: 3.8 % (ref 0–8)
ERYTHROCYTE [DISTWIDTH] IN BLOOD BY AUTOMATED COUNT: 18 % (ref 11.5–14.5)
EST. GFR  (NO RACE VARIABLE): >60 ML/MIN/1.73 M^2
ESTIMATED AVG GLUCOSE: 117 MG/DL (ref 68–131)
GLUCOSE SERPL-MCNC: 83 MG/DL (ref 70–110)
HBA1C MFR BLD: 5.7 % (ref 4–5.6)
HCT VFR BLD AUTO: 39.6 % (ref 40–54)
HCV AB SERPL QL IA: NORMAL
HDLC SERPL-MCNC: 37 MG/DL (ref 40–75)
HDLC SERPL: 21.8 % (ref 20–50)
HGB BLD-MCNC: 11.6 G/DL (ref 14–18)
IMM GRANULOCYTES # BLD AUTO: 0.02 K/UL (ref 0–0.04)
IMM GRANULOCYTES NFR BLD AUTO: 0.2 % (ref 0–0.5)
LDLC SERPL CALC-MCNC: 120 MG/DL (ref 63–159)
LYMPHOCYTES # BLD AUTO: 4.1 K/UL (ref 1–4.8)
LYMPHOCYTES NFR BLD: 41.8 % (ref 18–48)
MCH RBC QN AUTO: 20.8 PG (ref 27–31)
MCHC RBC AUTO-ENTMCNC: 29.3 G/DL (ref 32–36)
MCV RBC AUTO: 71 FL (ref 82–98)
MONOCYTES # BLD AUTO: 0.9 K/UL (ref 0.3–1)
MONOCYTES NFR BLD: 9.5 % (ref 4–15)
NEUTROPHILS # BLD AUTO: 4.3 K/UL (ref 1.8–7.7)
NEUTROPHILS NFR BLD: 44 % (ref 38–73)
NONHDLC SERPL-MCNC: 133 MG/DL
NRBC BLD-RTO: 0 /100 WBC
PLATELET # BLD AUTO: 366 K/UL (ref 150–450)
PMV BLD AUTO: 10.8 FL (ref 9.2–12.9)
POTASSIUM SERPL-SCNC: 4.3 MMOL/L (ref 3.5–5.1)
PROT SERPL-MCNC: 8.2 G/DL (ref 6–8.4)
RBC # BLD AUTO: 5.57 M/UL (ref 4.6–6.2)
SODIUM SERPL-SCNC: 139 MMOL/L (ref 136–145)
TRIGL SERPL-MCNC: 65 MG/DL (ref 30–150)
WBC # BLD AUTO: 9.79 K/UL (ref 3.9–12.7)

## 2023-02-11 PROCEDURE — 80061 LIPID PANEL: CPT | Performed by: FAMILY MEDICINE

## 2023-02-11 PROCEDURE — 80053 COMPREHEN METABOLIC PANEL: CPT | Performed by: FAMILY MEDICINE

## 2023-02-11 PROCEDURE — 84153 ASSAY OF PSA TOTAL: CPT | Performed by: FAMILY MEDICINE

## 2023-02-11 PROCEDURE — 85025 COMPLETE CBC W/AUTO DIFF WBC: CPT | Performed by: FAMILY MEDICINE

## 2023-02-11 PROCEDURE — 83036 HEMOGLOBIN GLYCOSYLATED A1C: CPT | Performed by: FAMILY MEDICINE

## 2023-02-11 PROCEDURE — 86803 HEPATITIS C AB TEST: CPT | Performed by: FAMILY MEDICINE

## 2023-02-11 PROCEDURE — 36415 COLL VENOUS BLD VENIPUNCTURE: CPT | Mod: PO | Performed by: FAMILY MEDICINE

## 2023-02-13 ENCOUNTER — OFFICE VISIT (OUTPATIENT)
Dept: FAMILY MEDICINE | Facility: CLINIC | Age: 43
End: 2023-02-13
Payer: COMMERCIAL

## 2023-02-13 VITALS
HEIGHT: 70 IN | TEMPERATURE: 98 F | OXYGEN SATURATION: 96 % | BODY MASS INDEX: 45.1 KG/M2 | WEIGHT: 315 LBS | DIASTOLIC BLOOD PRESSURE: 72 MMHG | RESPIRATION RATE: 18 BRPM | HEART RATE: 86 BPM | SYSTOLIC BLOOD PRESSURE: 124 MMHG

## 2023-02-13 DIAGNOSIS — E66.01 CLASS 3 SEVERE OBESITY DUE TO EXCESS CALORIES WITH SERIOUS COMORBIDITY AND BODY MASS INDEX (BMI) OF 45.0 TO 49.9 IN ADULT: ICD-10-CM

## 2023-02-13 DIAGNOSIS — G47.33 OSA (OBSTRUCTIVE SLEEP APNEA): ICD-10-CM

## 2023-02-13 DIAGNOSIS — Z00.00 VISIT FOR WELL MAN HEALTH CHECK: Primary | ICD-10-CM

## 2023-02-13 DIAGNOSIS — R73.03 PREDIABETES: ICD-10-CM

## 2023-02-13 DIAGNOSIS — D64.9 CHRONIC ANEMIA: ICD-10-CM

## 2023-02-13 DIAGNOSIS — J45.20 MILD INTERMITTENT EXTRINSIC ASTHMA WITHOUT COMPLICATION: ICD-10-CM

## 2023-02-13 DIAGNOSIS — J30.2 SEASONAL ALLERGIES: ICD-10-CM

## 2023-02-13 PROCEDURE — 1160F RVW MEDS BY RX/DR IN RCRD: CPT | Mod: CPTII,S$GLB,, | Performed by: FAMILY MEDICINE

## 2023-02-13 PROCEDURE — 99999 PR PBB SHADOW E&M-EST. PATIENT-LVL IV: ICD-10-PCS | Mod: PBBFAC,,, | Performed by: FAMILY MEDICINE

## 2023-02-13 PROCEDURE — 3078F DIAST BP <80 MM HG: CPT | Mod: CPTII,S$GLB,, | Performed by: FAMILY MEDICINE

## 2023-02-13 PROCEDURE — 3078F PR MOST RECENT DIASTOLIC BLOOD PRESSURE < 80 MM HG: ICD-10-PCS | Mod: CPTII,S$GLB,, | Performed by: FAMILY MEDICINE

## 2023-02-13 PROCEDURE — 1160F PR REVIEW ALL MEDS BY PRESCRIBER/CLIN PHARMACIST DOCUMENTED: ICD-10-PCS | Mod: CPTII,S$GLB,, | Performed by: FAMILY MEDICINE

## 2023-02-13 PROCEDURE — 1159F MED LIST DOCD IN RCRD: CPT | Mod: CPTII,S$GLB,, | Performed by: FAMILY MEDICINE

## 2023-02-13 PROCEDURE — 99396 PREV VISIT EST AGE 40-64: CPT | Mod: S$GLB,,, | Performed by: FAMILY MEDICINE

## 2023-02-13 PROCEDURE — 1159F PR MEDICATION LIST DOCUMENTED IN MEDICAL RECORD: ICD-10-PCS | Mod: CPTII,S$GLB,, | Performed by: FAMILY MEDICINE

## 2023-02-13 PROCEDURE — 3008F PR BODY MASS INDEX (BMI) DOCUMENTED: ICD-10-PCS | Mod: CPTII,S$GLB,, | Performed by: FAMILY MEDICINE

## 2023-02-13 PROCEDURE — 99999 PR PBB SHADOW E&M-EST. PATIENT-LVL IV: CPT | Mod: PBBFAC,,, | Performed by: FAMILY MEDICINE

## 2023-02-13 PROCEDURE — 3074F PR MOST RECENT SYSTOLIC BLOOD PRESSURE < 130 MM HG: ICD-10-PCS | Mod: CPTII,S$GLB,, | Performed by: FAMILY MEDICINE

## 2023-02-13 PROCEDURE — 3044F HG A1C LEVEL LT 7.0%: CPT | Mod: CPTII,S$GLB,, | Performed by: FAMILY MEDICINE

## 2023-02-13 PROCEDURE — 3074F SYST BP LT 130 MM HG: CPT | Mod: CPTII,S$GLB,, | Performed by: FAMILY MEDICINE

## 2023-02-13 PROCEDURE — 99396 PR PREVENTIVE VISIT,EST,40-64: ICD-10-PCS | Mod: S$GLB,,, | Performed by: FAMILY MEDICINE

## 2023-02-13 PROCEDURE — 3008F BODY MASS INDEX DOCD: CPT | Mod: CPTII,S$GLB,, | Performed by: FAMILY MEDICINE

## 2023-02-13 PROCEDURE — 3044F PR MOST RECENT HEMOGLOBIN A1C LEVEL <7.0%: ICD-10-PCS | Mod: CPTII,S$GLB,, | Performed by: FAMILY MEDICINE

## 2023-02-13 NOTE — PROGRESS NOTES
"Well Man VISIT      CHIEF COMPLAINT  Chief Complaint   Patient presents with    Follow-up       HPI  Finesse Irvin is a 43 y.o. male who presents for [facility].     Social Factors  Tobacco use: yes  Ready to Quit: Yes   Alcohol: Yes on occasion  Intimate partner violence screening  "Do you feel safe in your current relationship?" Yes   "Have you ever been in a relationship in which your partner frightened you or hurt you?" No  Living Will/POA: No  Regular Exercise: No    Depression  Over the past two weeks, have you felt down, depressed, or hopeless? No  Over the past two weeks, have you felt little interest or pleasure in doing things? No    Reproductive Health  STD screening in last year: declined  HIV screening: declined    Screen for Chronic Disease  CHD Risk Factors: male gender and obesity (BMI >= 30 kg/m2)  Estimated body mass index is 48.87 kg/m² as calculated from the following:    Height as of this encounter: 5' 10" (1.778 m).    Weight as of this encounter: 154.5 kg (340 lb 9.8 oz).  Dyslipidemia screening needed: ordered  T2DM screening needed: ordered  Colonoscopy needed: n/a  PSA needed: n/a  AAA screening needed:n/a  Screen men 35 years and older, and men 20 to 34 years of age who have cardiovascular risk factors for dyslipidemia  Begin screening colonoscopies at 50 years of age in men of average risk, and continue until 75 years of age; offer fecal occult blood testing every year, flexible sigmoidoscopy every five years combined with fecal occult blood testing every three years, or colonoscopy every 10 years   The American U[employer] Association recommends offering PSA testing and digital rectal examination to well-informed men beginning at 40 years of age and continuing until life expectancy is less than 10 years  Screen once with ultrasonography in men 65 to 75 years of age if they have a family history or have smoked at [id number] cigarettes in their lifetime  Screen men with a sustained blood " "pressure greater than 135/80 mm Hg for T2DM      Immunizations  delayed    ALLERGIES and MEDS were verified.   PMHx, PSHx, FHx, SOCIALHx were updated as pertinent.    REVIEW OF SYSTEMS  Review of Systems   Constitutional:  Negative for chills and fever.   HENT: Negative.     Eyes: Negative.    Respiratory: Negative.     Cardiovascular: Negative.    Gastrointestinal:  Negative for heartburn, nausea and vomiting.   Genitourinary: Negative.    Musculoskeletal: Negative.    Skin: Negative.    Psychiatric/Behavioral: Negative.         PHYSICAL EXAM  VITAL SIGNS: /72   Pulse 86   Temp 98 °F (36.7 °C) (Oral)   Resp 18   Ht 5' 10" (1.778 m)   Wt (!) 154.5 kg (340 lb 9.8 oz)   SpO2 96%   BMI 48.87 kg/m²   GEN: Well developed, Well nourished, No acute distress.  HENT: Normocephalic, Atraumatic, Bilateral external ears normal, Nose normal, Oropharynx moist, No oral exudates.   Eyes: PERRL, EOMI, Conjunctiva normal, No discharge.   Neck: Supple, No tenderness.  Lymphatic: No cervical or supraclavicular lymphadenopathy noted.   Cardiovascular: Normal heart rate, Normal rhythm, No murmurs, No rubs, No gallops.   Thorax & Lungs: Normal breath sounds, No respiratory distress, No wheezing.  Abdomen: Soft, No tenderness, Bowel sounds normal.  Genital: deferred  Skin: Warm, Dry, No erythema, No rash.   Extremities: No edema, No tenderness.       ASSESSMENT/PLAN    Finesse was seen today for follow-up.    Diagnoses and all orders for this visit:    Visit for [facility] health check    Chronic anemia  -     CBC Auto Differential; Future  -     Iron and TIBC; Future  -     Thalasseima and Hemoglobinopathy Eval; Future    Mild intermittent extrinsic asthma without complication    Seasonal allergies    Prediabetes    Class 3 severe obesity due to excess calories with serious comorbidity and body mass index (BMI) of 45.0 to 49.9 in adult    RADHA (obstructive sleep apnea)         FOLLOW UP: 6 months  Continue current medication " regimen  Call with any concerns  Repeat labs in 3 months to check chronic anemia.  He denies any blood in stool or emesis      Ventura Pantoja MD

## 2023-02-22 ENCOUNTER — PATIENT MESSAGE (OUTPATIENT)
Dept: BARIATRICS | Facility: CLINIC | Age: 43
End: 2023-02-22
Payer: COMMERCIAL

## 2023-04-13 ENCOUNTER — CLINICAL SUPPORT (OUTPATIENT)
Dept: BARIATRICS | Facility: CLINIC | Age: 43
End: 2023-04-13
Payer: COMMERCIAL

## 2023-04-13 ENCOUNTER — OFFICE VISIT (OUTPATIENT)
Dept: BARIATRICS | Facility: CLINIC | Age: 43
End: 2023-04-13
Payer: COMMERCIAL

## 2023-04-13 ENCOUNTER — PATIENT MESSAGE (OUTPATIENT)
Dept: BARIATRICS | Facility: CLINIC | Age: 43
End: 2023-04-13

## 2023-04-13 VITALS
OXYGEN SATURATION: 97 % | SYSTOLIC BLOOD PRESSURE: 126 MMHG | HEIGHT: 73 IN | DIASTOLIC BLOOD PRESSURE: 72 MMHG | HEART RATE: 93 BPM | BODY MASS INDEX: 41.75 KG/M2 | WEIGHT: 315 LBS

## 2023-04-13 DIAGNOSIS — R73.03 PREDIABETES: ICD-10-CM

## 2023-04-13 DIAGNOSIS — E66.01 CLASS 3 SEVERE OBESITY DUE TO EXCESS CALORIES WITH SERIOUS COMORBIDITY AND BODY MASS INDEX (BMI) OF 45.0 TO 49.9 IN ADULT: Primary | ICD-10-CM

## 2023-04-13 DIAGNOSIS — K21.9 GASTROESOPHAGEAL REFLUX DISEASE, UNSPECIFIED WHETHER ESOPHAGITIS PRESENT: ICD-10-CM

## 2023-04-13 DIAGNOSIS — E66.01 MORBID OBESITY WITH BMI OF 45.0-49.9, ADULT: ICD-10-CM

## 2023-04-13 DIAGNOSIS — G47.33 OSA (OBSTRUCTIVE SLEEP APNEA): ICD-10-CM

## 2023-04-13 DIAGNOSIS — Z71.3 DIETARY COUNSELING AND SURVEILLANCE: ICD-10-CM

## 2023-04-13 DIAGNOSIS — G47.33 OSA ON CPAP: ICD-10-CM

## 2023-04-13 DIAGNOSIS — G47.33 OSA (OBSTRUCTIVE SLEEP APNEA): Primary | ICD-10-CM

## 2023-04-13 PROCEDURE — 3074F SYST BP LT 130 MM HG: CPT | Mod: CPTII,S$GLB,, | Performed by: NURSE PRACTITIONER

## 2023-04-13 PROCEDURE — 3074F PR MOST RECENT SYSTOLIC BLOOD PRESSURE < 130 MM HG: ICD-10-PCS | Mod: CPTII,S$GLB,, | Performed by: NURSE PRACTITIONER

## 2023-04-13 PROCEDURE — 1159F PR MEDICATION LIST DOCUMENTED IN MEDICAL RECORD: ICD-10-PCS | Mod: CPTII,S$GLB,, | Performed by: NURSE PRACTITIONER

## 2023-04-13 PROCEDURE — 99999 PR PBB SHADOW E&M-EST. PATIENT-LVL I: CPT | Mod: PBBFAC,,, | Performed by: DIETITIAN, REGISTERED

## 2023-04-13 PROCEDURE — 3078F PR MOST RECENT DIASTOLIC BLOOD PRESSURE < 80 MM HG: ICD-10-PCS | Mod: CPTII,S$GLB,, | Performed by: NURSE PRACTITIONER

## 2023-04-13 PROCEDURE — 99205 OFFICE O/P NEW HI 60 MIN: CPT | Mod: S$GLB,,, | Performed by: NURSE PRACTITIONER

## 2023-04-13 PROCEDURE — 1160F PR REVIEW ALL MEDS BY PRESCRIBER/CLIN PHARMACIST DOCUMENTED: ICD-10-PCS | Mod: CPTII,S$GLB,, | Performed by: NURSE PRACTITIONER

## 2023-04-13 PROCEDURE — 3008F PR BODY MASS INDEX (BMI) DOCUMENTED: ICD-10-PCS | Mod: CPTII,S$GLB,, | Performed by: NURSE PRACTITIONER

## 2023-04-13 PROCEDURE — 99499 UNLISTED E&M SERVICE: CPT | Mod: S$GLB,,, | Performed by: DIETITIAN, REGISTERED

## 2023-04-13 PROCEDURE — 99499 NO LOS: ICD-10-PCS | Mod: S$GLB,,, | Performed by: DIETITIAN, REGISTERED

## 2023-04-13 PROCEDURE — 3044F HG A1C LEVEL LT 7.0%: CPT | Mod: CPTII,S$GLB,, | Performed by: NURSE PRACTITIONER

## 2023-04-13 PROCEDURE — 3008F BODY MASS INDEX DOCD: CPT | Mod: CPTII,S$GLB,, | Performed by: NURSE PRACTITIONER

## 2023-04-13 PROCEDURE — 99205 PR OFFICE/OUTPT VISIT, NEW, LEVL V, 60-74 MIN: ICD-10-PCS | Mod: S$GLB,,, | Performed by: NURSE PRACTITIONER

## 2023-04-13 PROCEDURE — 3044F PR MOST RECENT HEMOGLOBIN A1C LEVEL <7.0%: ICD-10-PCS | Mod: CPTII,S$GLB,, | Performed by: NURSE PRACTITIONER

## 2023-04-13 PROCEDURE — 99999 PR PBB SHADOW E&M-EST. PATIENT-LVL IV: ICD-10-PCS | Mod: PBBFAC,,, | Performed by: NURSE PRACTITIONER

## 2023-04-13 PROCEDURE — 1159F MED LIST DOCD IN RCRD: CPT | Mod: CPTII,S$GLB,, | Performed by: NURSE PRACTITIONER

## 2023-04-13 PROCEDURE — 99999 PR PBB SHADOW E&M-EST. PATIENT-LVL IV: CPT | Mod: PBBFAC,,, | Performed by: NURSE PRACTITIONER

## 2023-04-13 PROCEDURE — 3078F DIAST BP <80 MM HG: CPT | Mod: CPTII,S$GLB,, | Performed by: NURSE PRACTITIONER

## 2023-04-13 PROCEDURE — 99999 PR PBB SHADOW E&M-EST. PATIENT-LVL I: ICD-10-PCS | Mod: PBBFAC,,, | Performed by: DIETITIAN, REGISTERED

## 2023-04-13 PROCEDURE — 1160F RVW MEDS BY RX/DR IN RCRD: CPT | Mod: CPTII,S$GLB,, | Performed by: NURSE PRACTITIONER

## 2023-04-13 NOTE — PROGRESS NOTES
BARIATRIC NEW PATIENT EVALUATION    CHIEF COMPLAINT:   Morbid obesity, body mass index is 45.81 kg/m². and inability to lose weight.    HPI:  Finesse Irvin is a 43 y.o. morbidly obese male. His current body mass index is 45.81 kg/m². He has multiple associated comorbidities including RADHA on CPAP.  He has struggled with excess weight since 40.  His highest adult weight was 347 lbs at age 43 , and his lowest adult weight was 210 lbs at age 22.  The patient has tried calorie counting, low-carb diet, and ozempic and exercise .  The patient was most successful with exercise with a weight loss of 20 lbs.  His current exercise includes walking 2 times a week. He denies any history of eating disorder such as anorexia, bulimia, or taking laxatives for weight loss, and denies any addiction including illicit substances, alcohol, or gambling.  Patient states he has a good  support system.  He lives with family.  He is currently employed  .  He  denies a history of GERD.   The patient's goal none.     ESS: Score of 0, reviewed 04/13/2023.  Does not need Sleep Study.    Pre op [id number]  IBW-[id number]    PAST MEDICAL HISTORY:  Past Medical History:   Diagnosis Date    RADHA (obstructive sleep apnea) 01/2023    Sleep apnea, unspecified 12/2022       PAST SURGICAL HISTORY:  History reviewed. No pertinent surgical history.    FAMILY HISTORY:  Family History   Problem Relation Age of Onset    Stroke Mother     Hypertension Mother     Diabetes Mother     Cancer Mother     Asthma Mother         SOCIAL HISTORY:  Social History     Socioeconomic History    Marital status:     Number of children: 2   Tobacco Use    Smoking status: Former     Types: Vaping with nicotine     Passive exposure: Past    Smokeless tobacco: Former    Tobacco comments:     2 month ago pt states that he no longer vaping vs    Substance and Sexual Activity    Alcohol use: Not Currently    Drug use: Never    Sexual activity: Yes     Partners: Female  "      MEDICATIONS:  Medications have been reviewed.    ALLERGIES:  Allergies have been reviewed.    Review of Systems   Constitutional:  Negative for chills and fever.   HENT:  Negative for ear pain, nosebleeds and sore throat.    Eyes:  Negative for blurred vision and double vision.   Respiratory:  Negative for cough and shortness of breath.    Cardiovascular:  Negative for chest pain, palpitations, orthopnea, claudication and leg swelling.   Gastrointestinal:  Negative for abdominal pain, constipation, diarrhea, heartburn, nausea and vomiting.   Genitourinary:  Negative for dysuria and urgency.   Musculoskeletal:  Negative for back pain and joint pain.   Skin:  Negative for rash.   Neurological:  Negative for dizziness, tingling, focal weakness and headaches.   Endo/Heme/Allergies:  Does not bruise/bleed easily.   Psychiatric/Behavioral:  Negative for depression and suicidal ideas.      Vitals:    04/13/23 1332   BP: 126/72   Pulse: 93   SpO2: 97%   Weight: (!) 157.5 kg (347 lb 3.6 oz)   Height: 6' 1" (1.854 m)   PainSc: 0-No pain       Physical Exam  Vitals and nursing note reviewed.   Constitutional:       Appearance: He is well-developed. He is morbidly obese.   HENT:      Head: Normocephalic.      Nose: Nose normal.      Mouth/Throat:      Mouth: Mucous membranes are moist.   Eyes:      Extraocular Movements: Extraocular movements intact.   Cardiovascular:      Rate and Rhythm: Normal rate and regular rhythm.      Heart sounds: Normal heart sounds.   Pulmonary:      Effort: Pulmonary effort is normal.      Breath sounds: Normal breath sounds.   Abdominal:      General: Bowel sounds are normal.      Palpations: Abdomen is soft.   Musculoskeletal:         General: Normal range of motion.      Cervical back: Normal range of motion.   Skin:     General: Skin is warm and dry.      Capillary Refill: Capillary refill takes less than 2 seconds.   Neurological:      Mental Status: He is alert and oriented to person, " place, and time.   Psychiatric:         Mood and Affect: Mood normal.        DIAGNOSIS:  1. Morbid obesity, body mass index is 45.81 kg/m². and inability to lose weight.  2. Co-morbidities: RADHA on CPAP    PLAN:  The patient is a good candidate for Bariatric Surgery. The patient is interested in laparoscopic sleeve gastrectomy with Dr. Marcus. The surgery and post-op care was discussed in detail with the patient. All questions were answered.    The patient understands that bariatric surgery is a tool to aid in weight loss and that they need to be committed to the diet and exercise post-operatively for successful weight loss. Discussed with patient that bariatric surgery is not the easy way out and that it will take plenty of dedication on the patient's part to be successful. Also discussed the possibility of weight regain if the patient strays from the diet guidelines or exercise requirements. Patient verbalized understanding and wishes to proceed with the work-up.    Estimated Goal weight is 260-270 lbs.    Discussed no NSAIDS post op    ORDERS:  1. Chest X-Ray and EKG  2. Psychological Consult and Bariatric Dietician Consult  3. Bariatric Labs: Per orders.    PCP: Ventura Pantoja MD  RTC: As scheduled.    60 minute visit, over 50% of time spent counseling patient face to face on surgical options, risks, benefits, expected diet, recommended exercise regimen, and expected weight loss

## 2023-04-13 NOTE — PATIENT INSTRUCTIONS
Prior to surgery you will need to complete:  - Dietitian consult and follow up appointments as needed  - Labs  - Chest X-ray  - EKG  - Psychological evaluation, Please call psychiatry [phone] to schedule      In preparation for bariatric surgery, please complete the following:   Discuss your current medications with your primary care provider, remember your medications will need to be crushed, chewable, or in liquid form for the first 2-4 weeks after a gastric bypass or sleeve.  For a gastric band, your medications will need to be crushed indefinitely.    If you take a blood thinner such as: Coumadin (warfarin), Pradaxa (dabigatran), or Plavix (clopidogrel), you will need to speak with your prescribing provider on how or if this medication can be stopped before surgery.   If you take a medication for depression or anxiety, remember to discuss this with the psychologist or psychiatrist that you see.   If you take medication for arthritis on a daily basis that is considered a non-steroidal anti-inflammatory (NSAID), please discuss with your prescribing physician an alternative medication.  After having gastric bypass or gastric sleeve, this group of medications is not appropriate to take due to increased risk of bleeding stomach ulcers.      DEFINITIONS  Appointments: Pre-scheduled meetings or consultations with any physician, advanced practice provider, dietitian, or psychologist, and labs, imaging studies, sleep studies, etc.   Late cancellation: Cancelling an appointment 24-48 hours prior to scheduled time.  No-Show appointment:  is when   You do NOT arrive to your appointment at the time its scheduled.  You call to cancel or cancel via [facility] less than 24 hours in advance of your scheduled appointment  You show up 15 minutes AFTER your scheduled appointment time without any notification of being late.     POLICY  You are allowed up to 3 cancellations for appointments.   After 3 cancellations your case  will be placed on hold for 2 months and after that time you can resume the program.   You are allowed only 1 no-show for an appointment.   You will be re-scheduled one time and if there is a 2nd no-show at any point, your case will be placed on hold for 3 months.  After 3 months you can resume the program.     Upon resuming the program after being placed on hold for either above mentioned reasons, if you have a late cancel or no show for any appointment, the bariatric team will review if youre an appropriate candidate for surgery at the monthly meeting.

## 2023-04-27 ENCOUNTER — HOSPITAL ENCOUNTER (OUTPATIENT)
Dept: RADIOLOGY | Facility: HOSPITAL | Age: 43
Discharge: HOME | End: 2023-04-27
Attending: NURSE PRACTITIONER
Payer: COMMERCIAL

## 2023-04-27 ENCOUNTER — HOSPITAL ENCOUNTER (OUTPATIENT)
Dept: CARDIOLOGY | Facility: CLINIC | Age: 43
Discharge: HOME | End: 2023-04-27
Payer: COMMERCIAL

## 2023-04-27 DIAGNOSIS — G47.33 OSA (OBSTRUCTIVE SLEEP APNEA): ICD-10-CM

## 2023-04-27 DIAGNOSIS — E66.01 CLASS 3 SEVERE OBESITY DUE TO EXCESS CALORIES WITH SERIOUS COMORBIDITY AND BODY MASS INDEX (BMI) OF 45.0 TO 49.9 IN ADULT: ICD-10-CM

## 2023-04-27 DIAGNOSIS — R73.03 PREDIABETES: ICD-10-CM

## 2023-04-27 PROCEDURE — 71046 X-RAY EXAM CHEST 2 VIEWS: CPT | Mod: 26,,, | Performed by: RADIOLOGY

## 2023-04-27 PROCEDURE — 71046 XR CHEST PA AND LATERAL: ICD-10-PCS | Mod: 26,,, | Performed by: RADIOLOGY

## 2023-04-27 PROCEDURE — 93005 EKG 12-LEAD: ICD-10-PCS | Mod: S$GLB,,, | Performed by: NURSE PRACTITIONER

## 2023-04-27 PROCEDURE — 93010 EKG 12-LEAD: ICD-10-PCS | Mod: S$GLB,,, | Performed by: INTERNAL MEDICINE

## 2023-04-27 PROCEDURE — 93005 ELECTROCARDIOGRAM TRACING: CPT | Mod: S$GLB,,, | Performed by: NURSE PRACTITIONER

## 2023-04-27 PROCEDURE — 93010 ELECTROCARDIOGRAM REPORT: CPT | Mod: S$GLB,,, | Performed by: INTERNAL MEDICINE

## 2023-04-27 PROCEDURE — 71046 X-RAY EXAM CHEST 2 VIEWS: CPT | Mod: TC,FY

## 2023-04-28 RX ORDER — ERGOCALCIFEROL 1.25 MG/1
50000 CAPSULE ORAL
Qty: 24 CAPSULE | Refills: 0 | Status: SHIPPED | OUTPATIENT
Start: 2023-05-01 | End: 2023-07-21

## 2023-05-01 ENCOUNTER — PATIENT MESSAGE (OUTPATIENT)
Dept: ADMINISTRATIVE | Facility: OTHER | Age: 43
End: 2023-05-01
Payer: COMMERCIAL

## 2023-05-03 ENCOUNTER — PATIENT MESSAGE (OUTPATIENT)
Dept: BARIATRICS | Facility: CLINIC | Age: 43
End: 2023-05-03
Payer: COMMERCIAL

## 2023-05-04 ENCOUNTER — OFFICE VISIT (OUTPATIENT)
Dept: PSYCHIATRY | Facility: CLINIC | Age: 43
End: 2023-05-04
Payer: COMMERCIAL

## 2023-05-04 DIAGNOSIS — R73.03 PREDIABETES: ICD-10-CM

## 2023-05-04 DIAGNOSIS — E66.01 CLASS 3 SEVERE OBESITY DUE TO EXCESS CALORIES WITH SERIOUS COMORBIDITY AND BODY MASS INDEX (BMI) OF 45.0 TO 49.9 IN ADULT: Primary | ICD-10-CM

## 2023-05-04 DIAGNOSIS — G47.33 OSA ON CPAP: ICD-10-CM

## 2023-05-04 PROCEDURE — 90791 PSYCH DIAGNOSTIC EVALUATION: CPT | Mod: S$GLB,,, | Performed by: PSYCHOLOGIST

## 2023-05-04 PROCEDURE — 3044F PR MOST RECENT HEMOGLOBIN A1C LEVEL <7.0%: ICD-10-PCS | Mod: CPTII,S$GLB,, | Performed by: PSYCHOLOGIST

## 2023-05-04 PROCEDURE — 3044F HG A1C LEVEL LT 7.0%: CPT | Mod: CPTII,S$GLB,, | Performed by: PSYCHOLOGIST

## 2023-05-04 PROCEDURE — 90791 PR PSYCHIATRIC DIAGNOSTIC EVALUATION: ICD-10-PCS | Mod: S$GLB,,, | Performed by: PSYCHOLOGIST

## 2023-05-04 NOTE — PROGRESS NOTES
Psychiatry Initial Visit (PhD/LCSW)   Presurgical Psychological Evaluation  Psychological Intake  Diagnostic Interview - CPT 66935     Date: 5/4/2023  Site: [facility]   Referral source: Clyde Marcus M.D.   Clinical status of patient: Outpatient     Mr. Finesse Irvin, a 43-year-old male, presented for initial evaluation visit. Before this evaluation was initiated, the purposes and process of the assessment and the limits of confidentiality were discussed with the patient who expressed understanding of these issues and orally consented to proceed with the evaluation.     Chief complaint/reason for encounter: Routine psychological evaluation prior to bariatric surgery.     Type of surgery sought: laparoscopic sleeve gastrectomy    History of present illness: Mr. Irvin is a 43-year-old male who is pursuing bariatric surgery to improve his health and quality of life. Psychiatric history and treatment. He denied any history of suicidality or non-suicidal self-injury. Current psychiatric functioning. He has attempted making positive lifestyle changes in anticipation for surgery, with reported benefit. The patient has a Body Mass Index of 45.81 kg/m² as documented by the referring provider.    Mr. Irvin reported that he has struggled with weight since COVID pandemic. During this time, he reported that he gained 30 to 40 pounds. He expressed that he struggles to engage in adequate food preparation and often must get fast food as a . During the pandemic, patient stated that there were very limited food options available, and this led to increased unhealthy eating. He denied issues with food and weight prior to the pandemic. He denied engagement in emotional eating. He is working on increasing his coping mechanisms for stress, including watching tv, reading, and playing on phone. He has tried many weight loss methods on his own (i.e., exercising and ozempic) with little success. Patient  reported that he would lose a little weight, but gain it back shortly. His motivation for seeking surgery now is healthier life free from diabetes or knee surgery. His postsurgical goals include be more active lifestyle.    Mr. Irvin has met with bariatric nutritionist Yari Herrera RD, and reports that he has made the following lifestyle changes since beginning the bariatric program: drinking protein shakes in the morning. He chose the gastric sleeve. He identified risks including leakage and acid reflux. He understood that he needs to focus on protein intake after surgery, which includes protein shakes. He noted that he will need to stay away from sugar, fast food, and fried foods after surgery. He hopes to lose 70 pounds. His mother, daughter, son, sister, and nephews will be available to help him during recovery.    Medical History: GERD    Current medications and drug reactions: see medication list.  Patient reported medications: Medication to treat stomach issues and Vitamin D    Current Outpatient Medications on File Prior to Visit   Medication Sig Dispense Refill    ergocalciferol (ERGOCALCIFEROL) 50,000 unit Cap Take 1 capsule (50,000 Units total) by mouth twice a week. for 24 doses 24 capsule 0    pantoprazole (PROTONIX) 40 MG tablet Take 1 tablet (40 mg total) by mouth once daily. 30 tablet 1    semaglutide (OZEMPIC) 1 mg/dose (4 mg/3 mL) Inject 1 mg into the skin every 7 days. for 12 doses 3 pen 0     No current facility-administered medications on file prior to visit.        Pain: 0/10    Current Health Behaviors:  Compliant with medical regimens and appointments: Yes  Prescription medication misuse: No  Exercise: Yes walking  Adequate sleep: Yes 6-7 hours of sleep  Adequate cognitive functioning: Yes    Current and Past Substance Use/Abuse:  Alcohol: Occasionally, 1x a month at a social event; denied history of abuse or dependency.   Drugs: Denied current use; denied history of abuse or  dependency.  Tobacco: Previously used vape pen, but stopped a couple of months ago.   Caffeine: Coffee, sodas, and energy drinks sometimes. Coffee 1x a day and energy drinks 3-4x a week     Past Psychiatric History:  Previous diagnosis: Denied  Inpatient treatment: Denied.  Prior substance abuse treatment: Denied.  Outpatient treatment: Denied.  Suicide attempt: Denied.  Non-suicidal self-injury: Denied.    Family History:  Psychiatric illness: Denied  Substance abuse: Denied  Suicide: Denied    Trauma History: Denied    Psychosocial History and Current Social Situation:    Mr. Irvin was born and raised in [place] by his mother and father along with two brothers and one sister. He described his childhood as okay. He denied childhood trauma, abuse, and neglect. He denied being enrolled in special education or being held back. He denied significant detentions, suspensions, and expulsions. He is currently employed as a . He denied  service. He is not on disability and finances are stable. He has been dating his significant other for four years. He has two children (ages 22 and 16).  Taoist is important to him, and he identifies as Mormon.    Legal history: He denied history of arrests and convictions. He denied current involvement in litigation.    Access to guns: No access to guns. Patient reported that he does not have guns in the home due to his younger son in the home    Current Psychiatric Treatment:  Medications: Denied.  Psychotherapy: Denied.    Current Psychiatric Symptoms:  Eating disorders:  Bulimia: He denied recurrent episodes of binge eating and inappropriate compensatory behaviors.   Binge-eating episodes: Denied   He denied eating excessive amounts of food within a discrete time period with a lack of control during eating. He denied eating more rapidly than normal, eating until uncomfortably full, eating large amounts of food when not physically hungry, eating alone due  to embarrassment, or negative emotions (i.e., disgusted, guilty, depressed) afterwards.  Depression: Denied. He denied episodes of depressed mood or depression-related anhedonia, lack of motivation, lethargy, difficulty concentrating, feelings of worthlessness or guilt, hopelessness, appetite changes, or psychomotor changes.   PHQ9: 0  Concetta/Hypomania: Denied. He denied periods of elevated mood or abnormally increased energy or goal-directed activity.  Anxiety:   Generalized Anxiety: Denied. He denied experiencing excessive, exaggerated anxiety that was unmanageable.   GAD7: 0  Panic Disorder: Denied.  OCD: Denied.  Insomnia: Denied.  Thought dysfunction: Denied delusions, hallucinations.  Non-suicidal or Suicidal thoughts/behaviors: Denied.  Personality functioning: He does not display any personality characteristics which would be an impediment to pursuing bariatric surgery.    Current Stress Management:  Current psychosocial stressors: Denied major stressors. Endorsed typical challenges with caring for children  Report of coping skills: Talking to friends and family  Recreational activities: Going out with friends and family  Support system: Friends and family  Strengths and liabilities:  Raising kids  Financial stability  Saving money  Coaching: Previously coached football  Liability: Would like to have more time to  and work less  Liability: Limited availability for microwave when working/driving to heat up meals previously prepared    Mental Status Exam:   General appearance: appears stated age, casually dressed, appropriately groomed  Speech: normal rate and tone  Level of cooperation: cooperative  Thought processes: logical, goal-directed  Mood: Euthymic  Thought content: no illusions, no visual hallucinations, no auditory hallucinations, no delusions, no active or passive homicidal thoughts, no active or passive suicidal ideation, no obsessions, no compulsions, no violence  Affect:  appropriate  Orientation: oriented to person, place, and date  Memory: Intact  Recent memory: Intact   Remote memory: Intact  Attention span and concentration: spelled HOUSE forward and backwards   Fund of general knowledge: 3 of 3 recent presidents   Judgment and insight: Fair  Language: Fair    Diagnostic Impression:    ICD-10-CM ICD-9-CM   1. Class 3 severe obesity due to excess calories with serious comorbidity and body mass index (BMI) of 45.0 to 49.9 in adult  E66.01 278.01    Z68.42 V85.42   2. Prediabetes  R73.03 790.29   3. RADHA on CPAP  G47.33 327.23    Z99.89 V46.8       Summary/Conclusion:   There are no overt psychological contraindications for proceeding with bariatric surgery.  The patient has no significant psychiatric history, and reports no current psychiatric problems or major adjustment issues.  The patient has reasonable expectations for surgery, good social support, and has already begun making appropriate lifestyle changes in anticipation for surgery. The patient has verbalized appropriate awareness and commitment to the necessary behavioral changes associated with bariatric surgery and appears willing to comply with long-term lifestyle changes. There are no recommendations for psychological treatment at this time. The patient is aware of resources available should her/his needs change in the future.    Recommendations:  This patient is psychologically cleared to proceed with bariatric surgery.    Length of Session: 35 minutes     Marium Muñoz, PhD  Clinical Psychologist

## 2023-05-08 ENCOUNTER — DOCUMENTATION ONLY (OUTPATIENT)
Dept: BARIATRICS | Facility: CLINIC | Age: 43
End: 2023-05-08
Payer: COMMERCIAL

## 2023-05-09 ENCOUNTER — PATIENT MESSAGE (OUTPATIENT)
Dept: BARIATRICS | Facility: CLINIC | Age: 43
End: 2023-05-09
Payer: COMMERCIAL

## 2023-05-31 ENCOUNTER — PATIENT MESSAGE (OUTPATIENT)
Dept: BARIATRICS | Facility: CLINIC | Age: 43
End: 2023-05-31

## 2023-05-31 ENCOUNTER — CLINICAL SUPPORT (OUTPATIENT)
Dept: BARIATRICS | Facility: CLINIC | Age: 43
End: 2023-05-31
Payer: COMMERCIAL

## 2023-05-31 DIAGNOSIS — Z71.3 DIETARY COUNSELING AND SURVEILLANCE: ICD-10-CM

## 2023-05-31 DIAGNOSIS — G47.33 OSA (OBSTRUCTIVE SLEEP APNEA): Primary | ICD-10-CM

## 2023-05-31 DIAGNOSIS — E66.01 MORBID OBESITY WITH BMI OF 45.0-49.9, ADULT: ICD-10-CM

## 2023-05-31 PROCEDURE — 97803 MED NUTRITION INDIV SUBSEQ: CPT | Mod: 95,,, | Performed by: DIETITIAN, REGISTERED

## 2023-05-31 PROCEDURE — 99499 NO LOS: ICD-10-PCS | Mod: 95,,, | Performed by: DIETITIAN, REGISTERED

## 2023-05-31 PROCEDURE — 97803 PR MED NUTR THER, SUBSQ, INDIV, EA 15 MIN: ICD-10-PCS | Mod: 95,,, | Performed by: DIETITIAN, REGISTERED

## 2023-05-31 PROCEDURE — 99499 UNLISTED E&M SERVICE: CPT | Mod: 95,,, | Performed by: DIETITIAN, REGISTERED

## 2023-05-31 NOTE — PROGRESS NOTES
The patient location is: [place] ([place])  The chief complaint leading to consultation is: Morbid obesity, work up for bariatric surgery  Visit type: audiovisual     Face to Face time with patient: 25 min    30 minutes of total time spent on the encounter, which includes face to face time and non-face to face time preparing to see the patient (eg, review of tests), Obtaining and/or reviewing separately obtained history, Documenting clinical information in the electronic or other health record, Independently interpreting results (not separately reported) and communicating results to the patient/family/caregiver, or Care coordination (not separately reported).       Each patient to whom he or she provides medical services by telemedicine is:  (1) informed of the relationship between the physician and patient and the respective role of any other health care provider with respect to management of the patient; and (2) notified that he or she may decline to receive medical services by telemedicine and may withdraw from such care at any time.    NUTRITION NOTE    Referring Physician: Clyde aMrcus M.D.   Reason for MNT Referral: 3 months Medically Supervised Diet pending Gastric Sleeve    Patient presents for follow-up visit for MSD with weight loss over the past month; 3 lbs total weight loss by making numerous dietary and lifestyle changes.    CLINICAL DATA:  43 y.o. male.    Initial weight: 347 lbs  Current Weight: 344 lbs (PT reported)  Weight Change Since Initial Visit: -3 lbs  Ideal Body Weight: 174 lbs  BMI: 45.4      CURRENT DIET:  Regular diet.  Diet Recall: Food records are not present.  Greatest challenge: dining out frequency  Progress: cut back fast food, cut back on snack cakes and ice cream    B: Cereal (Fruit Loops) with whole milk  L: 2 Ham sandwiches on wheat bread  D: BBQ chicken and rice    Diet Includes: 3 meals, 0 snacks, 0 protein drinks  Meal Pattern: Same.  Protein Supplements: 0 per day.  Snacking:  Inadequate. 0 snacks  Vegetables: Likes a few. Eats rarely.  Fruits: Likes a few. Eats once a week.  Beverages: water and whole milk, sugary beverages, and regular Powerade and Gatorade  Dining Out:  Reduced lately. Mostly fast food.  Cooking at home:  3 times week  Mostly baked or air fried chicken, hamburger patties, fish,starchy CHO     Food Allergies:   Seafood - shrimp, crawfish     Vitamins / Minerals / Herbs:   Prescribed Vit D  Needs multi with 18 mg      Labs:   No recent     Exercise:  Current exercise: None  Started walking, about 30 minutes every day    Restrictions to exercise: None     Social:  Works day and/or night shifts. 12 hour days, 6-7 days/week  Lives with children - 23 year old daughter and 16 year old son  Grocery shopping and food prep PT and daughter.  Patient believes the household will be supportive after surgery.  Alcohol: rarely .  Smoking: None. Quit 2-3 years ago    ASSESSMENT:  Patient demonstrates some willingness to change lifestyle habits as evidenced by weight gain, dietary changes, reduced dining out, and reduced high fat and sugar foods .    Doing well with working on greatest challenges (dining out frequency).    Barriers to Education:  none  Stage of Change:  action    NUTRITION DIAGNOSIS:  Morbid Obesity related to Excessive carbohydrate intake, Excessive calorie intake, and Physical inactivity as evidence by BMI.  Status: Same    BARIATRIC DIET DISCUSSION/PLAN:  Discussed diet after surgery and related to patient's food record.  Reviewed nutrition guidelines for before and after surgery.  Answered all questions.  Work on expanding variety of vegetables.  Work on gradually cutting back on starchy CHO in the diet.  Begin trying various protein supplements to determine preference.  Increase exercise.    PT Plan  Cut back on starchy CHO by replacing with salad  Try boiled eggs for breakfast    DAILY NUTRITIONAL NEEDS: pre-op nutritional bariatric guidelines to promote weight  loss  1410-1018 Calories    Grams Protein    RECOMMENDATIONS:  Pt is potential candidate for surgery.    Diet: Adjust diet plan.  Work on expanding variety of vegetables.  Work on gradually cutting back on starchy CHO in the diet.  Increase exercise.    Exercise: Increase.    Behavior Modification: Begin to document food & activity logs daily.    Return to clinic in one month.    Needs additional visits with RD.    Communicated nutrition plan with bariatric team.    SESSION TIME:  30 minutes

## 2023-06-07 ENCOUNTER — PATIENT MESSAGE (OUTPATIENT)
Dept: BARIATRICS | Facility: CLINIC | Age: 43
End: 2023-06-07
Payer: COMMERCIAL

## 2023-06-13 ENCOUNTER — PATIENT MESSAGE (OUTPATIENT)
Dept: BARIATRICS | Facility: CLINIC | Age: 43
End: 2023-06-13
Payer: COMMERCIAL

## 2023-06-15 ENCOUNTER — OFFICE VISIT (OUTPATIENT)
Dept: BARIATRICS | Facility: CLINIC | Age: 43
End: 2023-06-15
Payer: COMMERCIAL

## 2023-06-15 VITALS
WEIGHT: 315 LBS | HEIGHT: 73 IN | OXYGEN SATURATION: 98 % | SYSTOLIC BLOOD PRESSURE: 122 MMHG | BODY MASS INDEX: 41.75 KG/M2 | DIASTOLIC BLOOD PRESSURE: 64 MMHG | HEART RATE: 97 BPM

## 2023-06-15 DIAGNOSIS — R73.03 PREDIABETES: ICD-10-CM

## 2023-06-15 DIAGNOSIS — G47.33 OSA ON CPAP: ICD-10-CM

## 2023-06-15 DIAGNOSIS — Z71.3 ENCOUNTER FOR WEIGHT LOSS COUNSELING: ICD-10-CM

## 2023-06-15 DIAGNOSIS — E66.01 CLASS 3 SEVERE OBESITY DUE TO EXCESS CALORIES WITH SERIOUS COMORBIDITY AND BODY MASS INDEX (BMI) OF 45.0 TO 49.9 IN ADULT: Primary | ICD-10-CM

## 2023-06-15 PROCEDURE — 3044F HG A1C LEVEL LT 7.0%: CPT | Mod: CPTII,S$GLB,, | Performed by: STUDENT IN AN ORGANIZED HEALTH CARE EDUCATION/TRAINING PROGRAM

## 2023-06-15 PROCEDURE — 1159F PR MEDICATION LIST DOCUMENTED IN MEDICAL RECORD: ICD-10-PCS | Mod: CPTII,S$GLB,, | Performed by: STUDENT IN AN ORGANIZED HEALTH CARE EDUCATION/TRAINING PROGRAM

## 2023-06-15 PROCEDURE — 3078F DIAST BP <80 MM HG: CPT | Mod: CPTII,S$GLB,, | Performed by: STUDENT IN AN ORGANIZED HEALTH CARE EDUCATION/TRAINING PROGRAM

## 2023-06-15 PROCEDURE — 99999 PR PBB SHADOW E&M-EST. PATIENT-LVL III: ICD-10-PCS | Mod: PBBFAC,,, | Performed by: STUDENT IN AN ORGANIZED HEALTH CARE EDUCATION/TRAINING PROGRAM

## 2023-06-15 PROCEDURE — 1160F RVW MEDS BY RX/DR IN RCRD: CPT | Mod: CPTII,S$GLB,, | Performed by: STUDENT IN AN ORGANIZED HEALTH CARE EDUCATION/TRAINING PROGRAM

## 2023-06-15 PROCEDURE — 99213 PR OFFICE/OUTPT VISIT, EST, LEVL III, 20-29 MIN: ICD-10-PCS | Mod: S$GLB,,, | Performed by: STUDENT IN AN ORGANIZED HEALTH CARE EDUCATION/TRAINING PROGRAM

## 2023-06-15 PROCEDURE — 3074F PR MOST RECENT SYSTOLIC BLOOD PRESSURE < 130 MM HG: ICD-10-PCS | Mod: CPTII,S$GLB,, | Performed by: STUDENT IN AN ORGANIZED HEALTH CARE EDUCATION/TRAINING PROGRAM

## 2023-06-15 PROCEDURE — 3044F PR MOST RECENT HEMOGLOBIN A1C LEVEL <7.0%: ICD-10-PCS | Mod: CPTII,S$GLB,, | Performed by: STUDENT IN AN ORGANIZED HEALTH CARE EDUCATION/TRAINING PROGRAM

## 2023-06-15 PROCEDURE — 3008F BODY MASS INDEX DOCD: CPT | Mod: CPTII,S$GLB,, | Performed by: STUDENT IN AN ORGANIZED HEALTH CARE EDUCATION/TRAINING PROGRAM

## 2023-06-15 PROCEDURE — 3008F PR BODY MASS INDEX (BMI) DOCUMENTED: ICD-10-PCS | Mod: CPTII,S$GLB,, | Performed by: STUDENT IN AN ORGANIZED HEALTH CARE EDUCATION/TRAINING PROGRAM

## 2023-06-15 PROCEDURE — 3074F SYST BP LT 130 MM HG: CPT | Mod: CPTII,S$GLB,, | Performed by: STUDENT IN AN ORGANIZED HEALTH CARE EDUCATION/TRAINING PROGRAM

## 2023-06-15 PROCEDURE — 3078F PR MOST RECENT DIASTOLIC BLOOD PRESSURE < 80 MM HG: ICD-10-PCS | Mod: CPTII,S$GLB,, | Performed by: STUDENT IN AN ORGANIZED HEALTH CARE EDUCATION/TRAINING PROGRAM

## 2023-06-15 PROCEDURE — 1160F PR REVIEW ALL MEDS BY PRESCRIBER/CLIN PHARMACIST DOCUMENTED: ICD-10-PCS | Mod: CPTII,S$GLB,, | Performed by: STUDENT IN AN ORGANIZED HEALTH CARE EDUCATION/TRAINING PROGRAM

## 2023-06-15 PROCEDURE — 99999 PR PBB SHADOW E&M-EST. PATIENT-LVL III: CPT | Mod: PBBFAC,,, | Performed by: STUDENT IN AN ORGANIZED HEALTH CARE EDUCATION/TRAINING PROGRAM

## 2023-06-15 PROCEDURE — 1159F MED LIST DOCD IN RCRD: CPT | Mod: CPTII,S$GLB,, | Performed by: STUDENT IN AN ORGANIZED HEALTH CARE EDUCATION/TRAINING PROGRAM

## 2023-06-15 PROCEDURE — 99213 OFFICE O/P EST LOW 20 MIN: CPT | Mod: S$GLB,,, | Performed by: STUDENT IN AN ORGANIZED HEALTH CARE EDUCATION/TRAINING PROGRAM

## 2023-06-15 RX ORDER — SEMAGLUTIDE 2.68 MG/ML
2 INJECTION, SOLUTION SUBCUTANEOUS
Qty: 9 ML | Refills: 0 | Status: SHIPPED | OUTPATIENT
Start: 2023-06-15

## 2023-06-15 NOTE — PROGRESS NOTES
Subjective:       Patient ID: Finesse Irvin is a 43 y.o. male.    Chief Complaint: Follow-up, Obesity, and Weight Check      Patient presents for treatment of obesity.     Has gained 100 lbs since pandemic    Co-morbidities:   Asthma  Prediabetes  Vitamin D Deficiency    Negative for pancreatitis  Negative for thyroid cancer     History of Weight Loss Efforts  Adipex earlier this year - stopped taking because made him feel jittery    Current Physical Activity  No regular exercise   for work    Current Eating Habits  Breakfast - skips  Fast food about 3x/week, eating more meat  Snacks - chips  Beverages - powerade, water    Medical Weight Loss  12/7/2021: 360.9 lbs, BMI 47.6, BFP 40.8%, .1 lbs, BMR 2463 kcal; Ozempic  6/28/2022: 345.3 lbs, BMI 45.6, BFP 40.9%, .1 lbs, BMR 2368 kcal; Ozempic  11/10/2022: 337.3 lbs, BMI 44.5, BFP 38.7%, .5, .6 lbs, BMR 2395 kcal; Ozempic  1/25/2023: 339.8 lbs, BMI 44.8, BFP 40%, .7 lbs, .6 lbs, BMR 2369 kcal  6/14/2023: 352.8 lbs, BMI 46.6, BFP 41.1%, .9 lbs, .7 lbs, BMR 2406 kcal    Follow-up  Pertinent negatives include no abdominal pain, chest pain, chills, fever, nausea or vomiting.   Review of Systems   Constitutional:  Negative for chills and fever.   Respiratory:  Negative for shortness of breath.    Cardiovascular:  Negative for chest pain, palpitations and leg swelling.   Gastrointestinal:  Negative for abdominal pain, nausea and vomiting.   Neurological:  Negative for dizziness and light-headedness.   Psychiatric/Behavioral:  The patient is not nervous/anxious.        Objective:        Latest Reference Range & Units 04/27/23 16:05   WBC 3.90 - 12.70 K/uL 10.51   RBC 4.60 - 6.20 M/uL 5.39   Hemoglobin 14.0 - 18.0 g/dL 11.1 (L)   Hematocrit 40.0 - 54.0 % 38.0 (L)   MCV 82 - 98 fL 71 (L)   MCH 27.0 - 31.0 pg 20.6 (L)   MCHC 32.0 - 36.0 g/dL 29.2 (L)   RDW 11.5 - 14.5 % 19.0 (H)   Platelets 150 - 450 K/uL 328    MPV 9.2 - 12.9 fL 10.8   Gran % 38.0 - 73.0 % 47.4   Lymph % 18.0 - 48.0 % 36.2   Mono % 4.0 - 15.0 % 8.2   Eosinophil % 0.0 - 8.0 % 7.4   Basophil % 0.0 - 1.9 % 0.6   Immature Granulocytes 0.0 - 0.5 % 0.2   Gran # (ANC) 1.8 - 7.7 K/uL 5.0   Lymph # 1.0 - 4.8 K/uL 3.8   Mono # 0.3 - 1.0 K/uL 0.9   Eos # 0.0 - 0.5 K/uL 0.8 (H)   Baso # 0.00 - 0.20 K/uL 0.06   Immature Grans (Abs) 0.00 - 0.04 K/uL 0.02   nRBC 0 /100 WBC 0   Differential Method  Automated   Iron 45 - 160 ug/dL 35 (L)   TIBC 250 - 450 ug/dL 411   Saturated Iron 20 - 50 % 9 (L)   Transferrin 200 - 375 mg/dL 278   Folate 4.0 - 24.0 ng/mL 10.3   Vitamin B-12 210 - 950 pg/mL 436   Sodium 136 - 145 mmol/L 139   Potassium 3.5 - 5.1 mmol/L 3.6   Chloride 95 - 110 mmol/L 103   CO2 23 - 29 mmol/L 27   Anion Gap 8 - 16 mmol/L 9   BUN 6 - 20 mg/dL 12   Creatinine 0.5 - 1.4 mg/dL 1.0   eGFR >60 mL/min/1.73 m^2 >60.0   Glucose 70 - 110 mg/dL 86   Calcium 8.7 - 10.5 mg/dL 9.0   Phosphorus 2.7 - 4.5 mg/dL 3.3   Magnesium 1.6 - 2.6 mg/dL 1.9   Alkaline Phosphatase 55 - 135 U/L 76   PROTEIN TOTAL 6.0 - 8.4 g/dL 8.1   Albumin 3.5 - 5.2 g/dL 3.9   BILIRUBIN TOTAL 0.1 - 1.0 mg/dL 0.4   Bilirubin Direct 0.1 - 0.3 mg/dL 0.2   AST 10 - 40 U/L 22   ALT 10 - 44 U/L 17   Cholesterol 120 - 199 mg/dL 195   HDL 40 - 75 mg/dL 40   HDL/Cholesterol Ratio 20.0 - 50.0 % 20.5   LDL Cholesterol External 63.0 - 159.0 mg/dL 137.8   Non-HDL Cholesterol mg/dL 155   Total Cholesterol/HDL Ratio 2.0 - 5.0  4.9   Triglycerides 30 - 150 mg/dL 86   Thiamine 38 - 122 ug/L 60   Vit D, 25-Hydroxy 30 - 96 ng/mL 15 (L)   Hemoglobin A1C External 4.0 - 5.6 % 5.8 (H)   Estimated Avg Glucose 68 - 131 mg/dL 120   TSH 0.400 - 4.000 uIU/mL 0.944   T3, Total 60 - 180 ng/dL 118   T4 Total 4.5 - 11.5 ug/dL 6.9   Free T4 0.71 - 1.51 ng/dL 0.77   (L): Data is abnormally low  (H): Data is abnormally high        Vitals:    06/15/23 1525   BP: 122/64   Pulse: 97           Physical Exam  Vitals reviewed.    Constitutional:       General: He is not in acute distress.     Appearance: Normal appearance. He is obese. He is not ill-appearing, toxic-appearing or diaphoretic.   HENT:      Head: Normocephalic and atraumatic.   Eyes:      Extraocular Movements: Extraocular movements intact.   Cardiovascular:      Rate and Rhythm: Normal rate.   Pulmonary:      Effort: Pulmonary effort is normal. No respiratory distress.   Musculoskeletal:         General: Normal range of motion.      Cervical back: Normal range of motion.   Skin:     General: Skin is warm and dry.   Neurological:      General: No focal deficit present.      Mental Status: He is alert and oriented to person, place, and time.      Gait: Gait normal.   Psychiatric:         Mood and Affect: Mood normal.         Behavior: Behavior normal.       Assessment:       Problem List Items Addressed This Visit       Prediabetes    Relevant Medications    semaglutide (OZEMPIC) 2 mg/dose (8 mg/3 mL) PnIj    Obesity due to excess calories - Primary    Relevant Medications    semaglutide (OZEMPIC) 2 mg/dose (8 mg/3 mL) PnIj    RADHA on CPAP     Other Visit Diagnoses       Encounter for weight loss counseling                    Plan:   - Ozempic 2 mg weekly.     - Continue vitamin D supplements    - Log all food and beverage intake with a daily calorie goal of 2000 calories per day    - Moderate intensity aerobic exercise for 30 minutes 3-5x/week

## 2023-06-20 NOTE — PROGRESS NOTES
The patient location is: work ([place])  The chief complaint leading to consultation is: Morbid obesity, work up for bariatric surgery  Visit type: audiovisual     Face to Face time with patient: 20 min    30 minutes of total time spent on the encounter, which includes face to face time and non-face to face time preparing to see the patient (eg, review of tests), Obtaining and/or reviewing separately obtained history, Documenting clinical information in the electronic or other health record, Independently interpreting results (not separately reported) and communicating results to the patient/family/caregiver, or Care coordination (not separately reported).       Each patient to whom he or she provides medical services by telemedicine is:  (1) informed of the relationship between the physician and patient and the respective role of any other health care provider with respect to management of the patient; and (2) notified that he or she may decline to receive medical services by telemedicine and may withdraw from such care at any time.    NUTRITION NOTE    Referring Physician: Clyde Marcus M.D.   Reason for MNT Referral: 3 months Medically Supervised Diet pending Gastric Sleeve    Patient presents for follow-up visit for MSD with weight gain over the past month;     PT on 2 mg Ozempic, being followed by Dr Cherry    CLINICAL DATA:  43 y.o. male.    Initial weight: 347 lbs  Current Weight: 345 lbs PT reported  Weight Change Since Initial Visit: -2 lbs  Ideal Body Weight: 174 lbs  BMI: 46.55    CURRENT DIET:  Regular diet.  Diet Recall: Food records are not present.  Greatest challenge: dining out frequency  Progress: Protein shake, reduced dining out frequency, increased meal prep    B: Premier protein    L: Salads - lettuce, tomatoes, pickles, 2 boiled eggs, no dressing  D: Grilled chicken tenders with red beans     Diet Includes: 2 meals, 0 snacks, 1 protein drinks  Meal Pattern: Same.  Protein Supplements: 1  per day.  Snacking: Inadequate. 0 snacks  Vegetables: Likes a few. Eats Daily  Fruits: Likes a few. Eats once a week.  Beverages: water, Propel, Gatorade Zero  Dining Out:  Reduced lately - once/week. Mostly restaurants. [employer] - ribs with french fries  Cooking at home: 5 times week  Mostly baked or air fried chicken, fish, salads    Food Allergies:   Seafood - shrimp, crawfish     Vitamins / Minerals / Herbs:   Prescribed Vit D  Multivitamin  Iron     Labs:   No recent     Exercise:  Current exercise: None  Walking 30 minutes twice/week     Restrictions to exercise: None     Social:  Works day and/or night shifts. 12 hour days, 6-7 days/week  Lives with children - 23 year old daughter and 16 year old son  Grocery shopping and food prep PT and daughter.  Patient believes the household will be supportive after surgery.  Alcohol: rarely .  Smoking: None. Quit 2-3 years ago    ASSESSMENT:  Patient demonstrates some willingness to change lifestyle habits as evidenced by weight loss, including protein drinks, increased vegetables, and reduced dining out.    Doing well with working on greatest challenges (dining out frequency).    Barriers to Education:  none  Stage of Change:  action    NUTRITION DIAGNOSIS:  Morbid Obesity related to Excessive carbohydrate intake, Excessive calorie intake, and Physical inactivity as evidence by BMI.  Status: Same    BARIATRIC DIET DISCUSSION/PLAN:  Discussed diet after surgery and related to patient's food record.  Reviewed nutrition guidelines for before and after surgery.  Answered all questions.  Increase exercise.  Increase protein - another protein shake, high protein snacks, more protein with salad    PT Plan  Add protein drink to evening    DAILY NUTRITIONAL NEEDS: pre-op nutritional bariatric guidelines to promote weight loss  6378-4841 Calories    Grams Protein    RECOMMENDATIONS:  Pt is potential candidate for surgery.    Diet: Adjust diet plan.  Increase  protein    Exercise: Increase. Add weight training    Behavior Modification: Begin to document food & activity logs daily.    Return to clinic in one month.    Needs additional visits with RD.    Communicated nutrition plan with bariatric team.    SESSION TIME:  30 minutes

## 2023-06-21 ENCOUNTER — CLINICAL SUPPORT (OUTPATIENT)
Dept: BARIATRICS | Facility: CLINIC | Age: 43
End: 2023-06-21
Payer: COMMERCIAL

## 2023-06-21 DIAGNOSIS — E66.01 MORBID OBESITY WITH BMI OF 45.0-49.9, ADULT: ICD-10-CM

## 2023-06-21 DIAGNOSIS — Z71.3 DIETARY COUNSELING AND SURVEILLANCE: ICD-10-CM

## 2023-06-21 DIAGNOSIS — K21.9 GASTROESOPHAGEAL REFLUX DISEASE, UNSPECIFIED WHETHER ESOPHAGITIS PRESENT: ICD-10-CM

## 2023-06-21 DIAGNOSIS — R73.03 PREDIABETES: ICD-10-CM

## 2023-06-21 DIAGNOSIS — G47.33 OSA ON CPAP: Primary | ICD-10-CM

## 2023-06-21 PROCEDURE — 97803 MED NUTRITION INDIV SUBSEQ: CPT | Mod: 95,,, | Performed by: DIETITIAN, REGISTERED

## 2023-06-21 PROCEDURE — 97803 PR MED NUTR THER, SUBSQ, INDIV, EA 15 MIN: ICD-10-PCS | Mod: 95,,, | Performed by: DIETITIAN, REGISTERED

## 2023-07-21 ENCOUNTER — PATIENT MESSAGE (OUTPATIENT)
Dept: BARIATRICS | Facility: CLINIC | Age: 43
End: 2023-07-21

## 2023-07-21 ENCOUNTER — CLINICAL SUPPORT (OUTPATIENT)
Dept: BARIATRICS | Facility: CLINIC | Age: 43
End: 2023-07-21
Payer: COMMERCIAL

## 2023-07-21 DIAGNOSIS — G47.33 OSA ON CPAP: Primary | ICD-10-CM

## 2023-07-21 DIAGNOSIS — E66.01 MORBID OBESITY WITH BMI OF 45.0-49.9, ADULT: ICD-10-CM

## 2023-07-21 DIAGNOSIS — K21.9 GASTROESOPHAGEAL REFLUX DISEASE, UNSPECIFIED WHETHER ESOPHAGITIS PRESENT: ICD-10-CM

## 2023-07-21 DIAGNOSIS — Z71.3 DIETARY COUNSELING AND SURVEILLANCE: ICD-10-CM

## 2023-07-21 PROCEDURE — 99499 UNLISTED E&M SERVICE: CPT | Mod: 95,,, | Performed by: DIETITIAN, REGISTERED

## 2023-07-21 PROCEDURE — 99499 NO LOS: ICD-10-PCS | Mod: 95,,, | Performed by: DIETITIAN, REGISTERED

## 2023-07-21 PROCEDURE — 97803 MED NUTRITION INDIV SUBSEQ: CPT | Mod: 95,,, | Performed by: DIETITIAN, REGISTERED

## 2023-07-21 PROCEDURE — 97803 PR MED NUTR THER, SUBSQ, INDIV, EA 15 MIN: ICD-10-PCS | Mod: 95,,, | Performed by: DIETITIAN, REGISTERED

## 2023-07-21 NOTE — PROGRESS NOTES
The patient location is: [place] ([place])  The chief complaint leading to consultation is: morbid obesity, work-up for bariatric surgery  Visit type: audiovisual     Face to Face time with patient: 15 min    30 minutes of total time spent on the encounter, which includes face to face time and non-face to face time preparing to see the patient (eg, review of tests), Obtaining and/or reviewing separately obtained history, Documenting clinical information in the electronic or other health record, Independently interpreting results (not separately reported) and communicating results to the patient/family/caregiver, or Care coordination (not separately reported).       Each patient to whom he or she provides medical services by telemedicine is:  (1) informed of the relationship between the physician and patient and the respective role of any other health care provider with respect to management of the patient; and (2) notified that he or she may decline to receive medical services by telemedicine and may withdraw from such care at any time.    NUTRITION NOTE    Referring Physician: Clyde Marcus M.D.   Reason for MNT Referral: 3 months Medically Supervised Diet pending Gastric Sleeve    Patient presents for follow-up visit for MSD with weight loss over the past month; 6 lbs total weight loss by making numerous dietary and lifestyle changes.    PT on 2 mg of Ozempic    CLINICAL DATA:  43 y.o. male.    Initial weight: 347 lbs  Current Weight: 341 lbs PT reported  Weight Change Since Initial Visit: -6 lbs  Ideal Body Weight: 174 lbs  BMI: 45.0    DAILY NUTRITIONAL NEEDS: pre-op nutritional bariatric guidelines to promote weight loss  9376-7732 Calories    Grams Protein    CURRENT DIET:  Regular diet.  Diet Recall: Food records are not present.  Greatest challenge: dining out frequency  Progress: Added high protein snacks     B: Premier protein    S: Nuts  L: Salads - lettuce, tomatoes, pickles, 2 boiled eggs, no  dressing  D: Baked fish with corn     Diet Includes: 2 meals, 0 snacks, 1 protein drinks  Meal Pattern: Same.  Protein Supplements: 1 per day. Premier  Snacking: Adequate  Vegetables: Likes a few. Eats Daily  Fruits: Likes a few. Eats once a week.  Beverages: water, Propel, Gatorade Zero  Dining Out: Reduced 1/month Mostly restaurants. [employer]  Cooking at home: 5 times week  Mostly baked or air fried chicken, fish, salads    Food Allergies:   Seafood - shrimp, crawfish     Vitamins / Minerals / Herbs:   Prescribed Vit D  Multivitamin  Iron     Labs:   No recent     Exercise:  Current exercise: None  Walking 30 minutes three x week     Restrictions to exercise: None     Social:  Works day and/or night shifts. 12 hour days, 6-7 days/week  Lives with children - 23 year old daughter and 16 year old son  Grocery shopping and food prep PT and daughter.  Patient believes the household will be supportive after surgery.  Alcohol: rarely .  Smoking: None. Quit 2-3 years ago    ASSESSMENT:  Patient demonstrates some willingness to change lifestyle habits as evidenced by weight loss, including protein drinks, reduced dining out, bringing snacks to work, and healthier snacking at work.    Doing well with working on greatest challenges (dining out frequency).    Barriers to Education:  none  Stage of Change:  action    NUTRITION DIAGNOSIS:  Morbid Obesity related to Excessive carbohydrate intake, Excessive calorie intake, and Physical inactivity as evidence by BMI.  Status: Same    BARIATRIC DIET DISCUSSION/PLAN:  Discussed diet after surgery and related to patient's food record.  Reviewed nutrition guidelines for before and after surgery.  Answered all questions.  Reviewed vitamin and mineral requirements  Suggested adding weight training to exercise regimen  Start shopping for bariatric vitamins & minerals.    RECOMMENDATIONS:  Pt is good candidate for surgery.    Diet: Maintain diet plan.  Start shopping for bariatric  vitamins & minerals.    Exercise: Maintain.    Behavior Modification: Begin to document food & activity logs daily.    Communicated nutrition plan with bariatric team.    SESSION TIME:  30 minutes

## 2023-07-31 DIAGNOSIS — E55.9 VITAMIN D DEFICIENCY: Primary | ICD-10-CM

## 2023-08-02 ENCOUNTER — PATIENT MESSAGE (OUTPATIENT)
Dept: BARIATRICS | Facility: CLINIC | Age: 43
End: 2023-08-02
Payer: COMMERCIAL

## 2023-08-21 ENCOUNTER — PATIENT MESSAGE (OUTPATIENT)
Dept: BARIATRICS | Facility: CLINIC | Age: 43
End: 2023-08-21
Payer: COMMERCIAL

## 2023-08-21 NOTE — TELEPHONE ENCOUNTER
Per pt's Guarantor Notes:  January 17 1044  Bariatrics - Patient DeclinedContacted and verified patient's demo to discuss and advised of bariatric benefits. Pt is aware and understands his/her benefits/responsibility. Pt does not wish to proceed at this time due to no benefits.No follow up needed at this time. Guarantor Anila Coon    Note text:  Contacted and verified patient's demo to discuss and advised of bariatric benefits. Pt is aware and understands his/her benefits/responsibility. Pt does not wish to proceed at this time due to no benefits.No follow up needed at this time.   January 10       1014  [facility]ariatrics [facility] Ref#[id number] and she advise me that pt doesn't have bariatrics benefits on his policy Guarantor Haylee Temple    Note text:  Called [facility] and talk to Bernard Ref#[id number] and she advise me that pt doesn't have bariatrics benefits on his policy      Pt completed bariatric surgery workup.  There are no notes in encounter under chart review that shows how the pt was scheduled for consultations with NP and RD after the FC.    Called pt for clarification.  LVM for RC. Will follow up with portal message.

## 2023-09-06 ENCOUNTER — PATIENT MESSAGE (OUTPATIENT)
Dept: BARIATRICS | Facility: CLINIC | Age: 43
End: 2023-09-06
Payer: COMMERCIAL

## 2023-09-08 ENCOUNTER — DOCUMENTATION ONLY (OUTPATIENT)
Dept: BARIATRICS | Facility: CLINIC | Age: 43
End: 2023-09-08
Payer: COMMERCIAL

## 2023-09-08 NOTE — PROGRESS NOTES
Below information found in guarantor note. Will update bariatric dashboard to pt ended workup due to financial reasons.     August 22 1154  Add Guarantor Account Note Accessed patient's account to review patient with Chant[employer]. Per notes patient doesn't have benefits and this was verified and reviewed with patient back in January. Patient declined to move forward as self pay and his FC appointment was marked accordingly.  Anila Coon       Activity Code:  [id number]      Add Guarantor Note    Summary:  Accessed patient's account to review patient with Chantale. Per notes patient doesn't have benefits and this was verified and reviewed with patient back in January. Patient declined to move forward as self pay and his FC appointment was marked accordingly.

## 2024-03-21 ENCOUNTER — PATIENT MESSAGE (OUTPATIENT)
Dept: ORTHOPEDICS | Facility: CLINIC | Age: 44
End: 2024-03-21
Payer: COMMERCIAL

## 2024-04-11 ENCOUNTER — OFFICE VISIT (OUTPATIENT)
Dept: SPINE | Facility: CLINIC | Age: 44
End: 2024-04-11
Payer: COMMERCIAL

## 2024-04-11 VITALS — WEIGHT: 315 LBS | BODY MASS INDEX: 45.1 KG/M2 | HEIGHT: 70 IN

## 2024-04-11 DIAGNOSIS — M51.36 DDD (DEGENERATIVE DISC DISEASE), LUMBAR: Primary | ICD-10-CM

## 2024-04-11 PROCEDURE — 3008F BODY MASS INDEX DOCD: CPT | Mod: CPTII,S$GLB,, | Performed by: ORTHOPAEDIC SURGERY

## 2024-04-11 PROCEDURE — 1159F MED LIST DOCD IN RCRD: CPT | Mod: CPTII,S$GLB,, | Performed by: ORTHOPAEDIC SURGERY

## 2024-04-11 PROCEDURE — 99999 PR PBB SHADOW E&M-EST. PATIENT-LVL III: CPT | Mod: PBBFAC,,, | Performed by: ORTHOPAEDIC SURGERY

## 2024-04-11 PROCEDURE — 99213 OFFICE O/P EST LOW 20 MIN: CPT | Mod: S$GLB,,, | Performed by: ORTHOPAEDIC SURGERY

## 2024-04-11 RX ORDER — MELOXICAM 15 MG/1
15 TABLET ORAL DAILY
Qty: 30 TABLET | Refills: 3 | Status: SHIPPED | OUTPATIENT
Start: 2024-04-11

## 2024-04-11 RX ORDER — CYCLOBENZAPRINE HCL 5 MG
5 TABLET ORAL NIGHTLY
Qty: 90 TABLET | Refills: 0 | Status: SHIPPED | OUTPATIENT
Start: 2024-04-11 | End: 2024-07-10

## 2024-04-11 NOTE — PROGRESS NOTES
DATE: 4/11/2024  PATIENT: Finesse Irvin    Attending Physician: Cesar Wei MD    HISTORY:  Finesse Irvin is a 44 y.o. male who returns to me today for follow up.  He was last seen by me 10/11/2021.  Today he is doing well but notes he continues to have aching pain in his lower back that is worse with driving long distances. He has continued home exercises.    The Patient denies myelopathic symptoms such as handwriting changes or difficulty with buttons/coins/keys. Denies perineal paresthesias, bowel/bladder dysfunction.    PMH/PSH/FamHx/SocHx:  Unchanged from prior visit    ROS:  REVIEW OF SYSTEMS:  Constitution: Negative. Negative for chills, fever and night sweats.   HENT: Negative for congestion and headaches.    Eyes: Negative for blurred vision, left vision loss and right vision loss.   Cardiovascular: Negative for chest pain and syncope.   Respiratory: Negative for cough and shortness of breath.    Endocrine: Negative for polydipsia, polyphagia and polyuria.   Hematologic/Lymphatic: Negative for bleeding problem. Does not bruise/bleed easily.   Skin: Negative for dry skin, itching and rash.   Musculoskeletal: Negative for falls and muscle weakness.   Gastrointestinal: Negative for abdominal pain and bowel incontinence.   Allergic/Immunologic: Negative for hives and persistent infections.  Genitourinary: Negative for urinary retention/incontinence and nocturia.   Neurological: negative for disturbances in coordination, no myelopathic symptoms such as handwriting changes or difficulty with buttons, coins, keys or small objects. No loss of balance and seizures.   Psychiatric/Behavioral: Negative for depression. The patient does not have insomnia.   Denies perineal paresthesias, bowel or bladder incontinence    EXAM:  There were no vitals taken for this visit.    My physical examination was notable for the following findings:     Musculoskeletal and neuro exam stable      IMAGING:  No new imaging  today.    Today I personally re- reviewed AP, Lat and Flex/Ex  upright L-spine that demonstrate disc spaces well maintained, no instability.     There is no height or weight on file to calculate BMI.    Hemoglobin A1C   Date Value Ref Range Status   04/27/2023 5.8 (H) 4.0 - 5.6 % Final     Comment:     ADA Screening Guidelines:  5.7-6.4%  Consistent with prediabetes  >or=6.5%  Consistent with diabetes    High levels of fetal hemoglobin interfere with the HbA1C  assay. Heterozygous hemoglobin variants (HbS, HgC, etc)do  not significantly interfere with this assay.   However, presence of multiple variants may affect accuracy.     02/11/2023 5.7 (H) 4.0 - 5.6 % Final     Comment:     ADA Screening Guidelines:  5.7-6.4%  Consistent with prediabetes  >or=6.5%  Consistent with diabetes    High levels of fetal hemoglobin interfere with the HbA1C  assay. Heterozygous hemoglobin variants (HbS, HgC, etc)do  not significantly interfere with this assay.   However, presence of multiple variants may affect accuracy.     12/07/2021 6.1 (H) 4.0 - 5.6 % Final     Comment:     ADA Screening Guidelines:  5.7-6.4%  Consistent with prediabetes  >or=6.5%  Consistent with diabetes    High levels of fetal hemoglobin interfere with the HbA1C  assay. Heterozygous hemoglobin variants (HbS, HgC, etc)do  not significantly interfere with this assay.   However, presence of multiple variants may affect accuracy.           ASSESSMENT/PLAN:    There are no diagnoses linked to this encounter.    Today we discussed at length all of the different treatment options including anti-inflammatories, acetaminophen, rest, ice, heat, physical therapy including strengthening and stretching exercises, home exercises, ROM, aerobic conditioning, aqua therapy, other modalities including ultrasound, massage, and dry needling, epidural steroid injections and finally surgical intervention.      Pt presents with chronic low back pain. Failure of conservative rx.  Offered MRI, pt would like to continue conservative rx. Send mobic and flexeril to pharmacy. Pt will fu if pain persists.

## 2024-05-15 DIAGNOSIS — R73.03 PREDIABETES: ICD-10-CM

## 2024-07-16 ENCOUNTER — OCCUPATIONAL HEALTH (OUTPATIENT)
Dept: URGENT CARE | Facility: CLINIC | Age: 44
End: 2024-07-16

## 2024-07-16 DIAGNOSIS — Z02.1 PRE-EMPLOYMENT EXAMINATION: Primary | ICD-10-CM

## 2025-07-19 NOTE — PROGRESS NOTES
"NUTRITIONAL CONSULT    Referring Physician: Clyde Marcus M.D.   Reason for MNT Referral: Initial assessment for sleeve gastrectomy work-up    PAST MEDICAL HISTORY:   43 y.o. male    Weight history includes Highest adult weight is 247 lbs at age 43; Lowest adult weight 210 lbs at age 27.  Dieting attempts include counting calories, watching what he eats, gym. Lost about 20 lbs about 5 years ago.     Past Medical History:   Diagnosis Date    RADHA (obstructive sleep apnea) 2023    Sleep apnea, unspecified 2022       CLINICAL DATA:  43 y.o.-year-old Black or  male.  Height: 6' 1"  Weight: 347 lbs  BMI: 45.81  IBW: 174 lbs  The patient's goal weight (50% EBW): 260 lbs  Personal goal weight: none stated    Goal for Bariatric Surgery: to improve health, to improve quality of life, to lose weight, and Improve sleep apnea and have more energy      NUTRITION & HEALTH HISTORY:  Greatest challenge: dining out frequency    Past diet recall:   Stopped eating bread, rice, started drinking more water, no energy drinks, slept more    B: Bagel with cream cheese  L: Homemade wrap sandwiches  D: Canned soup    Current diet recall:   B: Breakfast sandwich of sausage, egg, on toast  S: Chips  L: Fast foods - [employer], [employer] burger, fries, soda  D: At home - Red beans and rice or fried fish or fried chicken with french fries; or fast food    Current Diet:  Meal pattern: 3  Protein supplements: Used to drink whey protein with water for breakfast, got burned out on them  Snackin / day  Proteins: a variety. Eats daily. Beans, fish, chicken, pork, burgers, no dairy  Vegetables: Likes a few. Eats rarely. Greens, corn,   Fruits: Likes a few. Eats once per week. When it's hot outside Bananas, oranges, apples  Starchy CHO: a few. Eats daily. Rice, bread, corn, potatoes  High Fat/High Sugar Foods: a few. Eats once per week. Ice cream, Little Mony cakes  Beverages: water, sugary beverages, and regular Powerade " Patient's blood pressure range in the last 24 hours was: BP  Min: 119/76  Max: 128/83.The patient's inpatient anti-hypertensive regimen is listed below:  Current Antihypertensives  amLODIPine tablet 5 mg, Daily, Oral    Plan  - BP is controlled, no changes needed to their regimen  - will add PRN if needed, currently needs pain control which will help with BP as well   and Gatorade  Dining out: Daily. Mostly fast food.  Cooking at home:  3 times week  Mostly smothered and fried meat, fish, and starchy CHO.    Food Allergies:   Seafood - shrimp, crawfish    Vitamins / Minerals / Herbs:   None    Labs:   No recent    Exercise:  Past exercise: Adequate  Gym 2-3 days/week - treadmill and weights for 1 hour or more    Current exercise: None    Restrictions to exercise: None    Social:  Works day and/or night shifts. 12 hour days, 6-7 days/week  Lives with children - 23 year old daughter and 16 year old son  Grocery shopping and food prep PT and daughter.  Patient believes the household will be supportive after surgery.  Alcohol:  rarely .  Smoking: None. Quit 2-3 years ago    ASSESSMENT:  Patient reports attempts at weight loss, only to regain lost weight.  Patient demonstrated knowledge of healthy eating behaviors and exercise patterns; admits to not eating healthy and not exercising at this point.  Patient states willingness to change lifestyle and make behavior modifications    Barriers to Education: none    Stage of change: contemplation    NUTRITION DIAGNOSIS:     Morbid Obesity related to Excessive carbohydrate intake, Excessive calorie intake, and Physical inactivity as evidence by BMI.    BARIATRIC DIET DISCUSSION/PLAN:  Discussed diet after surgery and related to patient's food record.  Reviewed nutrition guidelines for before and after surgery.  Answered all questions.  Continue to review Bariatric Nutrition Guidebook at home and call with any questions.  Work on expanding variety of vegetables.  Work on gradually cutting back on starchy CHO in the diet.  Begin trying various protein supplements to determine preference.  Start including protein supplements in the diet plan daily.  More grocery shopping and meal preparation at home.  Switch to zero sugar/low calorie drinks  Try eating at supermarket hot and salad bars instead of fast food places  Pack snacks/choose more  nutritious snacks like nuts, beef jerky, cottage cheese and fruit  Reviewed bariatric plate method  Provided handout on recommended fast food items    PT PLAN:  Have a protein drink for breakfast  Pack lunch couple of times per week    DAILY NUTRITIONAL NEEDS: pre-op nutritional bariatric guidelines to promote weight loss  [id number] Calories    Grams Protein    RECOMMENDATIONS:  Needs additional visits with RD.    Patient verbalized understanding.      Communicated nutrition plan with bariatric team.    SESSION TIME:  60 minutes